# Patient Record
Sex: FEMALE | Race: WHITE | Employment: FULL TIME | URBAN - METROPOLITAN AREA
[De-identification: names, ages, dates, MRNs, and addresses within clinical notes are randomized per-mention and may not be internally consistent; named-entity substitution may affect disease eponyms.]

---

## 2017-03-23 ENCOUNTER — EMPLOYEE WELLNESS (OUTPATIENT)
Dept: OTHER | Age: 32
End: 2017-03-23

## 2017-03-23 ENCOUNTER — TELEPHONE (OUTPATIENT)
Dept: OBGYN | Age: 32
End: 2017-03-23

## 2017-03-23 LAB
CHOLESTEROL, TOTAL: 176 MG/DL (ref 160–199)
GLUCOSE BLD-MCNC: 93 MG/DL (ref 74–109)
HDLC SERPL-MCNC: 39 MG/DL (ref 65–121)
LDL CHOLESTEROL CALCULATED: 118 MG/DL
TRIGL SERPL-MCNC: 97 MG/DL (ref 150–199)

## 2017-05-15 ENCOUNTER — OFFICE VISIT (OUTPATIENT)
Dept: PRIMARY CARE CLINIC | Age: 32
End: 2017-05-15
Payer: COMMERCIAL

## 2017-05-15 ENCOUNTER — HOSPITAL ENCOUNTER (OUTPATIENT)
Dept: GENERAL RADIOLOGY | Age: 32
Discharge: HOME | End: 2017-05-15
Payer: COMMERCIAL

## 2017-05-15 ENCOUNTER — OFFICE VISIT (OUTPATIENT)
Dept: OBGYN | Age: 32
End: 2017-05-15
Payer: COMMERCIAL

## 2017-05-15 VITALS
SYSTOLIC BLOOD PRESSURE: 122 MMHG | BODY MASS INDEX: 38.6 KG/M2 | TEMPERATURE: 98.3 F | OXYGEN SATURATION: 96 % | HEART RATE: 70 BPM | WEIGHT: 269.6 LBS | DIASTOLIC BLOOD PRESSURE: 84 MMHG | HEIGHT: 70 IN

## 2017-05-15 VITALS
WEIGHT: 267 LBS | SYSTOLIC BLOOD PRESSURE: 122 MMHG | BODY MASS INDEX: 38.22 KG/M2 | HEIGHT: 70 IN | DIASTOLIC BLOOD PRESSURE: 76 MMHG

## 2017-05-15 DIAGNOSIS — M79.671 PAIN OF RIGHT HEEL: Primary | ICD-10-CM

## 2017-05-15 DIAGNOSIS — Z12.4 CERVICAL CANCER SCREENING: ICD-10-CM

## 2017-05-15 DIAGNOSIS — M79.671 PAIN OF RIGHT HEEL: ICD-10-CM

## 2017-05-15 DIAGNOSIS — Z01.419 WELL FEMALE EXAM WITH ROUTINE GYNECOLOGICAL EXAM: Primary | ICD-10-CM

## 2017-05-15 DIAGNOSIS — Z23 NEED FOR TDAP VACCINATION: ICD-10-CM

## 2017-05-15 PROCEDURE — 90715 TDAP VACCINE 7 YRS/> IM: CPT | Performed by: FAMILY MEDICINE

## 2017-05-15 PROCEDURE — 99395 PREV VISIT EST AGE 18-39: CPT | Performed by: NURSE PRACTITIONER

## 2017-05-15 PROCEDURE — 73650 X-RAY EXAM OF HEEL: CPT

## 2017-05-15 PROCEDURE — 87624 HPV HI-RISK TYP POOLED RSLT: CPT

## 2017-05-15 PROCEDURE — 88142 CYTOPATH C/V THIN LAYER: CPT

## 2017-05-15 PROCEDURE — 99213 OFFICE O/P EST LOW 20 MIN: CPT | Performed by: FAMILY MEDICINE

## 2017-05-15 PROCEDURE — 90471 IMMUNIZATION ADMIN: CPT | Performed by: FAMILY MEDICINE

## 2017-05-15 ASSESSMENT — ENCOUNTER SYMPTOMS
SHORTNESS OF BREATH: 0
EYES NEGATIVE: 1
RESPIRATORY NEGATIVE: 1
GASTROINTESTINAL NEGATIVE: 1
COUGH: 0

## 2017-05-24 LAB
HPV SOURCE: NORMAL
HPV, HIGH RISK: NEGATIVE

## 2017-08-10 ENCOUNTER — OFFICE VISIT (OUTPATIENT)
Dept: PRIMARY CARE CLINIC | Age: 32
End: 2017-08-10
Payer: COMMERCIAL

## 2017-08-10 VITALS
OXYGEN SATURATION: 98 % | TEMPERATURE: 97.9 F | DIASTOLIC BLOOD PRESSURE: 86 MMHG | HEIGHT: 70 IN | HEART RATE: 76 BPM | BODY MASS INDEX: 39 KG/M2 | SYSTOLIC BLOOD PRESSURE: 124 MMHG | WEIGHT: 272.4 LBS

## 2017-08-10 DIAGNOSIS — M54.5 ACUTE MIDLINE LOW BACK PAIN, WITH SCIATICA PRESENCE UNSPECIFIED: Primary | ICD-10-CM

## 2017-08-10 PROCEDURE — 99213 OFFICE O/P EST LOW 20 MIN: CPT | Performed by: FAMILY MEDICINE

## 2017-08-10 RX ORDER — IBUPROFEN 800 MG/1
800 TABLET ORAL EVERY 8 HOURS PRN
Qty: 30 TABLET | Refills: 0 | Status: SHIPPED | OUTPATIENT
Start: 2017-08-10 | End: 2019-08-23

## 2017-08-10 RX ORDER — CYCLOBENZAPRINE HCL 5 MG
5 TABLET ORAL 3 TIMES DAILY PRN
Qty: 20 TABLET | Refills: 0 | Status: SHIPPED | OUTPATIENT
Start: 2017-08-10 | End: 2019-01-27 | Stop reason: SDUPTHER

## 2017-08-11 ASSESSMENT — ENCOUNTER SYMPTOMS
ABDOMINAL PAIN: 0
BACK PAIN: 1
BOWEL INCONTINENCE: 0

## 2017-12-07 ENCOUNTER — OFFICE VISIT (OUTPATIENT)
Dept: PRIMARY CARE CLINIC | Age: 32
End: 2017-12-07
Payer: COMMERCIAL

## 2017-12-07 VITALS
HEART RATE: 64 BPM | HEIGHT: 70 IN | DIASTOLIC BLOOD PRESSURE: 84 MMHG | TEMPERATURE: 98.4 F | OXYGEN SATURATION: 93 % | BODY MASS INDEX: 38.65 KG/M2 | WEIGHT: 270 LBS | SYSTOLIC BLOOD PRESSURE: 126 MMHG

## 2017-12-07 DIAGNOSIS — J40 BRONCHITIS: Primary | ICD-10-CM

## 2017-12-07 PROCEDURE — 99213 OFFICE O/P EST LOW 20 MIN: CPT | Performed by: FAMILY MEDICINE

## 2017-12-07 RX ORDER — AZITHROMYCIN 250 MG/1
TABLET, FILM COATED ORAL
Qty: 1 PACKET | Refills: 0 | Status: SHIPPED | OUTPATIENT
Start: 2017-12-07 | End: 2017-12-17

## 2017-12-07 RX ORDER — CYCLOBENZAPRINE HCL 10 MG
10 TABLET ORAL 3 TIMES DAILY PRN
COMMUNITY
End: 2019-01-27

## 2017-12-07 RX ORDER — PREDNISONE 20 MG/1
40 TABLET ORAL DAILY
Qty: 10 TABLET | Refills: 0 | Status: SHIPPED | OUTPATIENT
Start: 2017-12-07 | End: 2017-12-12

## 2017-12-07 RX ORDER — TRIAMCINOLONE ACETONIDE 0.25 MG/G
CREAM TOPICAL EVERY 4 HOURS PRN
COMMUNITY
End: 2018-05-18

## 2017-12-07 ASSESSMENT — ENCOUNTER SYMPTOMS
WHEEZING: 1
COUGH: 1
SORE THROAT: 1

## 2017-12-07 NOTE — PROGRESS NOTES
Other Topics Concern    None     Social History Narrative    None       Physical Exam   Constitutional: She is oriented to person, place, and time. She appears well-developed and well-nourished. No distress. HENT:   Head: Normocephalic and atraumatic. Mouth/Throat: Oropharynx is clear and moist. No oropharyngeal exudate. Eyes: Conjunctivae are normal. No scleral icterus. Neck: Normal range of motion. Neck supple. Cardiovascular: Normal rate, regular rhythm and normal heart sounds. Exam reveals no friction rub. No murmur heard. Pulmonary/Chest: Effort normal and breath sounds normal. No respiratory distress. She has no wheezes. Neurological: She is alert and oriented to person, place, and time. Skin: Skin is warm and dry. No rash noted. She is not diaphoretic. Psychiatric: She has a normal mood and affect. Nursing note and vitals reviewed. Assessment    ICD-10-CM ICD-9-CM    1. Bronchitis J40 490 azithromycin (ZITHROMAX) 250 MG tablet (z-pack given(      predniSONE (DELTASONE) 20 MG tablet 2 tablets po daily x 5 days. Plan      Orders Placed This Encounter   Medications    azithromycin (ZITHROMAX) 250 MG tablet     Sig: Take 2 tabs (500 mg) on Day 1, and take 1 tab (250 mg) on days 2 through 5. Dispense:  1 packet     Refill:  0    predniSONE (DELTASONE) 20 MG tablet     Sig: Take 2 tablets by mouth daily for 5 days     Dispense:  10 tablet     Refill:  0       No orders of the defined types were placed in this encounter. Return if symptoms worsen or fail to improve. Patient Instructions       Patient Education        Bronchitis: Care Instructions  Your Care Instructions    Bronchitis is inflammation of the bronchial tubes, which carry air to the lungs. The tubes swell and produce mucus, or phlegm. The mucus and inflamed bronchial tubes make you cough. You may have trouble breathing. Most cases of bronchitis are caused by viruses like those that cause colds. ?Watch closely for changes in your health, and be sure to contact your doctor if:  ? · You cough more deeply or more often, especially if you notice more mucus or a change in the color of your mucus. ? · You are not getting better as expected. Where can you learn more? Go to [email or web address] org and sign in to your [employer] account. Enter H333 in the [employer] box to learn more about \"Bronchitis: Care Instructions. \"     If you do not have an account, please click on the \"Sign Up Now\" link. Current as of: May 12, 2017  Content Version: 11.4  © 2487-3772 Healthwise, Incorporated. Care instructions adapted under license by [facility] ([facility] [place]). If you have questions about a medical condition or this instruction, always ask your healthcare professional. [address] any warranty or liability for your use of this information.

## 2018-01-09 ENCOUNTER — OFFICE VISIT (OUTPATIENT)
Dept: OBGYN | Age: 33
End: 2018-01-09
Payer: COMMERCIAL

## 2018-01-09 VITALS
WEIGHT: 273 LBS | TEMPERATURE: 97.5 F | DIASTOLIC BLOOD PRESSURE: 84 MMHG | BODY MASS INDEX: 39.08 KG/M2 | RESPIRATION RATE: 18 BRPM | SYSTOLIC BLOOD PRESSURE: 139 MMHG | HEIGHT: 70 IN | HEART RATE: 77 BPM

## 2018-01-09 DIAGNOSIS — N92.6 MENSTRUAL PERIODS IRREGULAR: Primary | ICD-10-CM

## 2018-01-09 PROCEDURE — 99213 OFFICE O/P EST LOW 20 MIN: CPT | Performed by: NURSE PRACTITIONER

## 2018-01-09 ASSESSMENT — ENCOUNTER SYMPTOMS
WHEEZING: 0
SORE THROAT: 0
SHORTNESS OF BREATH: 0
APNEA: 0
TROUBLE SWALLOWING: 0
NAUSEA: 0
ABDOMINAL PAIN: 0
DIARRHEA: 0
CONSTIPATION: 0

## 2018-01-09 NOTE — PROGRESS NOTES
Subjective:      Patient ID: Roxanne Garcia is a 33 y.o. female. HPI   Patient presents today for irregular cycles. Skipped August and November. Dates of periods   May 28  Candace 28  July 27  Sept 12  Oct 23  Dec 1  Dec 29    Patient states that she is trying to conceive. Roxanne Garcia is a 33 y.o. female with the following history as recorded in [facility]: There are no active problems to display for this patient. Current Outpatient Prescriptions   Medication Sig Dispense Refill    cyclobenzaprine (FLEXERIL) 10 MG tablet Take 10 mg by mouth 3 times daily as needed for Muscle spasms      triamcinolone (KENALOG) 0.025 % cream Apply topically every 4 hours as needed Apply Topically      ibuprofen (ADVIL;MOTRIN) 800 MG tablet Take 1 tablet by mouth every 8 hours as needed for Pain 30 tablet 0    cetirizine (ZYRTEC) 10 MG tablet Take 10 mg by mouth daily       No current facility-administered medications for this visit. Allergies: Review of patient's allergies indicates no known allergies. Past Medical History:   Diagnosis Date    Abnormal Pap smear of cervix          Past Surgical History:   Procedure Laterality Date     SECTION       Family History   Problem Relation Age of Onset    Stroke Maternal Grandfather     Stroke Paternal Uncle      Social History   Substance Use Topics    Smoking status: Never Smoker    Smokeless tobacco: Never Used    Alcohol use 0.0 oz/week       Review of Systems   Constitutional: Negative for activity change, appetite change, fatigue, fever and unexpected weight change. HENT: Negative for ear pain, sore throat and trouble swallowing. Eyes: Negative for visual disturbance. Respiratory: Negative for apnea, shortness of breath and wheezing. Cardiovascular: Negative for chest pain and leg swelling. Gastrointestinal: Negative for abdominal pain, constipation, diarrhea and nausea.    Endocrine: Negative for cold intolerance and heat

## 2018-03-20 VITALS — WEIGHT: 268 LBS | BODY MASS INDEX: 38.45 KG/M2

## 2018-03-23 ENCOUNTER — EMPLOYEE WELLNESS (OUTPATIENT)
Dept: OTHER | Age: 33
End: 2018-03-23

## 2018-03-23 LAB
CHOLESTEROL, TOTAL: 154 MG/DL (ref 160–199)
GLUCOSE BLD-MCNC: 93 MG/DL (ref 74–109)
HDLC SERPL-MCNC: 34 MG/DL (ref 65–121)
LDL CHOLESTEROL CALCULATED: 94 MG/DL
TRIGL SERPL-MCNC: 130 MG/DL (ref 0–149)

## 2018-04-02 VITALS — BODY MASS INDEX: 38.45 KG/M2 | WEIGHT: 268 LBS

## 2018-05-18 ENCOUNTER — HOSPITAL ENCOUNTER (OUTPATIENT)
Age: 33
Setting detail: SPECIMEN
Discharge: HOME | End: 2018-05-18
Payer: COMMERCIAL

## 2018-05-18 ENCOUNTER — OFFICE VISIT (OUTPATIENT)
Dept: OBGYN | Age: 33
End: 2018-05-18
Payer: COMMERCIAL

## 2018-05-18 VITALS
SYSTOLIC BLOOD PRESSURE: 120 MMHG | DIASTOLIC BLOOD PRESSURE: 72 MMHG | WEIGHT: 270 LBS | BODY MASS INDEX: 38.65 KG/M2 | HEIGHT: 70 IN

## 2018-05-18 DIAGNOSIS — N92.6 IRREGULAR PERIODS: ICD-10-CM

## 2018-05-18 DIAGNOSIS — Z01.419 WELL WOMAN EXAM WITH ROUTINE GYNECOLOGICAL EXAM: Primary | ICD-10-CM

## 2018-05-18 DIAGNOSIS — Z12.4 SCREENING FOR CERVICAL CANCER: ICD-10-CM

## 2018-05-18 LAB
FOLLICLE STIMULATING HORMONE: 5.7 MIU/ML
HBA1C MFR BLD: 5.4 %
TESTOSTERONE TOTAL: 44.2 NG/DL (ref 8.4–48.1)
TSH SERPL DL<=0.05 MIU/L-ACNC: 2 UIU/ML (ref 0.27–4.2)

## 2018-05-18 PROCEDURE — 99395 PREV VISIT EST AGE 18-39: CPT | Performed by: OBSTETRICS & GYNECOLOGY

## 2018-05-18 PROCEDURE — 87624 HPV HI-RISK TYP POOLED RSLT: CPT

## 2018-05-18 PROCEDURE — 88142 CYTOPATH C/V THIN LAYER: CPT

## 2018-05-18 ASSESSMENT — ENCOUNTER SYMPTOMS
RESPIRATORY NEGATIVE: 1
GASTROINTESTINAL NEGATIVE: 1
ALLERGIC/IMMUNOLOGIC NEGATIVE: 1
EYES NEGATIVE: 1

## 2018-05-21 LAB — PROLACTIN: 11.9 NG/ML (ref 2.8–26)

## 2018-06-04 ENCOUNTER — OFFICE VISIT (OUTPATIENT)
Dept: PRIMARY CARE CLINIC | Age: 33
End: 2018-06-04
Payer: COMMERCIAL

## 2018-06-04 VITALS
TEMPERATURE: 99.2 F | DIASTOLIC BLOOD PRESSURE: 62 MMHG | HEIGHT: 70 IN | HEART RATE: 83 BPM | SYSTOLIC BLOOD PRESSURE: 118 MMHG | WEIGHT: 269.2 LBS | OXYGEN SATURATION: 98 % | BODY MASS INDEX: 38.54 KG/M2

## 2018-06-04 DIAGNOSIS — Z09 FOLLOW UP: Primary | ICD-10-CM

## 2018-06-04 PROCEDURE — 99212 OFFICE O/P EST SF 10 MIN: CPT | Performed by: NURSE PRACTITIONER

## 2018-06-04 ASSESSMENT — PATIENT HEALTH QUESTIONNAIRE - PHQ9
1. LITTLE INTEREST OR PLEASURE IN DOING THINGS: 0
2. FEELING DOWN, DEPRESSED OR HOPELESS: 0
SUM OF ALL RESPONSES TO PHQ9 QUESTIONS 1 & 2: 0
SUM OF ALL RESPONSES TO PHQ QUESTIONS 1-9: 0

## 2018-06-05 ASSESSMENT — ENCOUNTER SYMPTOMS
VOMITING: 0
SINUS PRESSURE: 0
NAUSEA: 0
BACK PAIN: 0
SHORTNESS OF BREATH: 0
WHEEZING: 0
ABDOMINAL PAIN: 0
DIARRHEA: 0
EYE PAIN: 0
COUGH: 0

## 2018-06-07 LAB
HPV TYPE 16: NOT DETECTED
HPV TYPE 18: NOT DETECTED
INTERPRETATION: NORMAL
OTHER HIGH RISK HPV: NOT DETECTED
SOURCE: NORMAL

## 2018-11-15 ENCOUNTER — OFFICE VISIT (OUTPATIENT)
Dept: PRIMARY CARE CLINIC | Age: 33
End: 2018-11-15
Payer: COMMERCIAL

## 2018-11-15 VITALS
HEIGHT: 70 IN | SYSTOLIC BLOOD PRESSURE: 120 MMHG | OXYGEN SATURATION: 99 % | WEIGHT: 269 LBS | TEMPERATURE: 97 F | DIASTOLIC BLOOD PRESSURE: 70 MMHG | BODY MASS INDEX: 38.51 KG/M2 | HEART RATE: 62 BPM

## 2018-11-15 DIAGNOSIS — F51.01 PRIMARY INSOMNIA: Primary | ICD-10-CM

## 2018-11-15 PROCEDURE — 99213 OFFICE O/P EST LOW 20 MIN: CPT | Performed by: NURSE PRACTITIONER

## 2019-01-26 ENCOUNTER — PATIENT MESSAGE (OUTPATIENT)
Dept: PRIMARY CARE CLINIC | Age: 34
End: 2019-01-26

## 2019-01-26 DIAGNOSIS — M54.5 ACUTE MIDLINE LOW BACK PAIN, WITH SCIATICA PRESENCE UNSPECIFIED: ICD-10-CM

## 2019-01-27 RX ORDER — CYCLOBENZAPRINE HCL 5 MG
5 TABLET ORAL 3 TIMES DAILY PRN
Qty: 20 TABLET | Refills: 0 | Status: SHIPPED | OUTPATIENT
Start: 2019-01-27 | End: 2019-06-25 | Stop reason: SDUPTHER

## 2019-03-04 ENCOUNTER — TELEPHONE (OUTPATIENT)
Dept: PRIMARY CARE CLINIC | Age: 34
End: 2019-03-04

## 2019-03-04 ENCOUNTER — OFFICE VISIT (OUTPATIENT)
Dept: PRIMARY CARE CLINIC | Age: 34
End: 2019-03-04
Payer: COMMERCIAL

## 2019-03-04 VITALS
TEMPERATURE: 97.9 F | WEIGHT: 266.6 LBS | DIASTOLIC BLOOD PRESSURE: 84 MMHG | HEART RATE: 60 BPM | HEIGHT: 70 IN | BODY MASS INDEX: 38.17 KG/M2 | OXYGEN SATURATION: 98 % | SYSTOLIC BLOOD PRESSURE: 118 MMHG

## 2019-03-04 DIAGNOSIS — N91.2 AMENORRHEA: Primary | ICD-10-CM

## 2019-03-04 DIAGNOSIS — R10.84 GENERALIZED ABDOMINAL PAIN: ICD-10-CM

## 2019-03-04 DIAGNOSIS — R10.84 GENERALIZED ABDOMINAL PAIN: Primary | ICD-10-CM

## 2019-03-04 DIAGNOSIS — M54.50 ACUTE BILATERAL LOW BACK PAIN WITHOUT SCIATICA: ICD-10-CM

## 2019-03-04 DIAGNOSIS — N91.2 AMENORRHEA: ICD-10-CM

## 2019-03-04 LAB
ALBUMIN SERPL-MCNC: 4.1 G/DL (ref 3.5–5.2)
ALP BLD-CCNC: 121 U/L (ref 35–104)
ALT SERPL-CCNC: 10 U/L (ref 5–33)
AMYLASE: 74 U/L (ref 28–100)
ANION GAP SERPL CALCULATED.3IONS-SCNC: 12 MMOL/L (ref 7–19)
AST SERPL-CCNC: 14 U/L (ref 5–32)
BILIRUB SERPL-MCNC: <0.2 MG/DL (ref 0.2–1.2)
BUN BLDV-MCNC: 8 MG/DL (ref 6–20)
CALCIUM SERPL-MCNC: 9.2 MG/DL (ref 8.6–10)
CHLORIDE BLD-SCNC: 103 MMOL/L (ref 98–111)
CO2: 25 MMOL/L (ref 22–29)
CREAT SERPL-MCNC: 0.6 MG/DL (ref 0.5–0.9)
GFR NON-AFRICAN AMERICAN: >60
GLUCOSE BLD-MCNC: 118 MG/DL (ref 74–109)
GONADOTROPIN, CHORIONIC (HCG) QUANT: 0.1 MIU/ML (ref 0–5.3)
HCT VFR BLD CALC: 35.2 % (ref 37–47)
HEMOGLOBIN: 11.1 G/DL (ref 12–16)
LIPASE: 24 U/L (ref 13–60)
MCH RBC QN AUTO: 27.6 PG (ref 27–31)
MCHC RBC AUTO-ENTMCNC: 31.5 G/DL (ref 33–37)
MCV RBC AUTO: 87.6 FL (ref 81–99)
PDW BLD-RTO: 14 % (ref 11.5–14.5)
PLATELET # BLD: 297 K/UL (ref 130–400)
PMV BLD AUTO: 10.7 FL (ref 9.4–12.3)
POTASSIUM SERPL-SCNC: 4.4 MMOL/L (ref 3.5–5)
RBC # BLD: 4.02 M/UL (ref 4.2–5.4)
SODIUM BLD-SCNC: 140 MMOL/L (ref 136–145)
TOTAL PROTEIN: 7.6 G/DL (ref 6.6–8.7)
WBC # BLD: 9.6 K/UL (ref 4.8–10.8)

## 2019-03-04 PROCEDURE — 99214 OFFICE O/P EST MOD 30 MIN: CPT | Performed by: FAMILY MEDICINE

## 2019-03-04 RX ORDER — ACETAMINOPHEN AND CODEINE PHOSPHATE 300; 30 MG/1; MG/1
1 TABLET ORAL EVERY 6 HOURS PRN
Qty: 12 TABLET | Refills: 0 | Status: SHIPPED | OUTPATIENT
Start: 2019-03-04 | End: 2019-03-07

## 2019-03-04 ASSESSMENT — ENCOUNTER SYMPTOMS
NAUSEA: 0
FLATUS: 0
DIARRHEA: 0
COUGH: 0
ABDOMINAL PAIN: 1
CONSTIPATION: 0
HEMATOCHEZIA: 0
SHORTNESS OF BREATH: 0

## 2019-03-04 ASSESSMENT — PATIENT HEALTH QUESTIONNAIRE - PHQ9
SUM OF ALL RESPONSES TO PHQ QUESTIONS 1-9: 0
2. FEELING DOWN, DEPRESSED OR HOPELESS: 0
SUM OF ALL RESPONSES TO PHQ9 QUESTIONS 1 & 2: 0
SUM OF ALL RESPONSES TO PHQ QUESTIONS 1-9: 0
1. LITTLE INTEREST OR PLEASURE IN DOING THINGS: 0

## 2019-03-05 ENCOUNTER — HOSPITAL ENCOUNTER (OUTPATIENT)
Dept: GENERAL RADIOLOGY | Age: 34
Discharge: HOME | End: 2019-03-05
Payer: COMMERCIAL

## 2019-03-05 DIAGNOSIS — R10.84 GENERALIZED ABDOMINAL PAIN: ICD-10-CM

## 2019-03-05 PROCEDURE — 74018 RADEX ABDOMEN 1 VIEW: CPT

## 2019-03-06 ENCOUNTER — TELEPHONE (OUTPATIENT)
Dept: PRIMARY CARE CLINIC | Age: 34
End: 2019-03-06

## 2019-04-04 ENCOUNTER — EMPLOYEE WELLNESS (OUTPATIENT)
Dept: OTHER | Age: 34
End: 2019-04-04

## 2019-04-04 LAB
CHOLESTEROL, TOTAL: 168 MG/DL (ref 160–199)
GLUCOSE BLD-MCNC: 88 MG/DL (ref 74–109)
HDLC SERPL-MCNC: 37 MG/DL (ref 65–121)
LDL CHOLESTEROL CALCULATED: 111 MG/DL
TRIGL SERPL-MCNC: 99 MG/DL (ref 0–149)

## 2019-04-08 VITALS — WEIGHT: 252 LBS | BODY MASS INDEX: 36.16 KG/M2

## 2019-06-24 ENCOUNTER — PATIENT MESSAGE (OUTPATIENT)
Dept: PRIMARY CARE CLINIC | Age: 34
End: 2019-06-24

## 2019-06-24 DIAGNOSIS — M54.5 ACUTE MIDLINE LOW BACK PAIN, WITH SCIATICA PRESENCE UNSPECIFIED: ICD-10-CM

## 2019-06-25 RX ORDER — CYCLOBENZAPRINE HCL 5 MG
5 TABLET ORAL 3 TIMES DAILY PRN
Qty: 20 TABLET | Refills: 0 | Status: SHIPPED | OUTPATIENT
Start: 2019-06-25 | End: 2019-07-05

## 2019-06-25 NOTE — TELEPHONE ENCOUNTER
From: Eric Olivier  To: RODERICK Montalvo  Sent: 6/24/2019 5:58 PM CDT  Subject: Prescription Question    I am having a lot of lower back pain. Started seeing chiropractor today. Can I have a refill for my flexeril please? Or do I need to make an appointment?

## 2019-08-23 ENCOUNTER — OFFICE VISIT (OUTPATIENT)
Dept: URGENT CARE | Age: 34
End: 2019-08-23
Payer: COMMERCIAL

## 2019-08-23 VITALS
SYSTOLIC BLOOD PRESSURE: 134 MMHG | WEIGHT: 261 LBS | OXYGEN SATURATION: 98 % | BODY MASS INDEX: 37.37 KG/M2 | DIASTOLIC BLOOD PRESSURE: 82 MMHG | HEART RATE: 87 BPM | RESPIRATION RATE: 16 BRPM | TEMPERATURE: 98.2 F | HEIGHT: 70 IN

## 2019-08-23 DIAGNOSIS — J02.9 ACUTE VIRAL PHARYNGITIS: Primary | ICD-10-CM

## 2019-08-23 DIAGNOSIS — J02.9 SORE THROAT: ICD-10-CM

## 2019-08-23 LAB — S PYO AG THROAT QL: NORMAL

## 2019-08-23 PROCEDURE — 87880 STREP A ASSAY W/OPTIC: CPT | Performed by: SPECIALIST

## 2019-08-23 PROCEDURE — 99213 OFFICE O/P EST LOW 20 MIN: CPT | Performed by: SPECIALIST

## 2019-08-23 ASSESSMENT — ENCOUNTER SYMPTOMS
SORE THROAT: 1
NAUSEA: 0
COUGH: 0
SWOLLEN GLANDS: 0

## 2019-08-23 NOTE — PROGRESS NOTES
[address] [facility]  [address]  [address] [place] [address]  Dept: [phone]  Loc: [phone]    Artur Lutz is a 36 y.o. female who presents today for her medical conditions/complaintsas noted below. Artur Lutz is c/o of Pharyngitis (Began yesterday )        HPI:     Pharyngitis   This is a new problem. The current episode started yesterday. The problem occurs constantly. The problem has been unchanged. Associated symptoms include a sore throat. Pertinent negatives include no congestion, coughing, fatigue, fever, nausea or swollen glands. The symptoms are aggravated by swallowing. She has tried nothing for the symptoms. Past Medical History:   Diagnosis Date    Abnormal Pap smear of cervix          Past Surgical History:   Procedure Laterality Date     SECTION         Family History   Problem Relation Age of Onset    Stroke Maternal Grandfather     Stroke Maternal Grandmother     Stroke Paternal Uncle     Stroke Paternal Aunt     Cancer Neg Hx     Diabetes Neg Hx     Heart Attack Neg Hx        Social History     Tobacco Use    Smoking status: Never Smoker    Smokeless tobacco: Never Used   Substance Use Topics    Alcohol use: Yes     Alcohol/week: 0.0 standard drinks     Comment: rare      Current Outpatient Medications   Medication Sig Dispense Refill    cetirizine (ZYRTEC) 10 MG tablet Take 10 mg by mouth daily       No current facility-administered medications for this visit. No Known Allergies    Health Maintenance   Topic Date Due    Varicella Vaccine (1 of 2 - 13+ 2-dose series) 1999    HIV screen  2001    Flu vaccine (1) 2019    Cervical cancer screen  2023    DTaP/Tdap/Td vaccine (2 - Td) 05/15/2027    Pneumococcal 0-64 years Vaccine  Aged Out       Subjective:     Review of Systems   Constitutional: Negative for fatigue and fever. HENT: Positive for sore throat.  Negative

## 2019-10-18 ENCOUNTER — OFFICE VISIT (OUTPATIENT)
Dept: PRIMARY CARE CLINIC | Age: 34
End: 2019-10-18
Payer: COMMERCIAL

## 2019-10-18 ENCOUNTER — HOSPITAL ENCOUNTER (OUTPATIENT)
Dept: GENERAL RADIOLOGY | Age: 34
Discharge: HOME | End: 2019-10-18
Payer: COMMERCIAL

## 2019-10-18 VITALS
OXYGEN SATURATION: 98 % | TEMPERATURE: 99.1 F | WEIGHT: 232.4 LBS | DIASTOLIC BLOOD PRESSURE: 76 MMHG | BODY MASS INDEX: 33.27 KG/M2 | HEART RATE: 90 BPM | SYSTOLIC BLOOD PRESSURE: 116 MMHG | HEIGHT: 70 IN

## 2019-10-18 DIAGNOSIS — M79.672 LEFT FOOT PAIN: ICD-10-CM

## 2019-10-18 DIAGNOSIS — M79.672 LEFT FOOT PAIN: Primary | ICD-10-CM

## 2019-10-18 DIAGNOSIS — M54.50 ACUTE BILATERAL LOW BACK PAIN WITHOUT SCIATICA: ICD-10-CM

## 2019-10-18 PROCEDURE — 73630 X-RAY EXAM OF FOOT: CPT

## 2019-10-18 PROCEDURE — 99213 OFFICE O/P EST LOW 20 MIN: CPT | Performed by: NURSE PRACTITIONER

## 2019-10-18 RX ORDER — CYCLOBENZAPRINE HCL 5 MG
5 TABLET ORAL PRN
Qty: 30 TABLET | Refills: 0 | Status: SHIPPED | OUTPATIENT
Start: 2019-10-18 | End: 2019-11-05 | Stop reason: SDUPTHER

## 2019-10-18 RX ORDER — CYCLOBENZAPRINE HCL 5 MG
5 TABLET ORAL PRN
COMMUNITY
End: 2019-10-18 | Stop reason: SDUPTHER

## 2019-10-19 DIAGNOSIS — M79.672 LEFT FOOT PAIN: Primary | ICD-10-CM

## 2019-10-21 ASSESSMENT — ENCOUNTER SYMPTOMS: RESPIRATORY NEGATIVE: 1

## 2019-11-05 DIAGNOSIS — M54.50 ACUTE BILATERAL LOW BACK PAIN WITHOUT SCIATICA: ICD-10-CM

## 2019-11-05 RX ORDER — CYCLOBENZAPRINE HCL 5 MG
TABLET ORAL
Qty: 20 TABLET | Refills: 0 | Status: SHIPPED | OUTPATIENT
Start: 2019-11-05 | End: 2020-01-10 | Stop reason: SDUPTHER

## 2020-01-10 ENCOUNTER — OFFICE VISIT (OUTPATIENT)
Dept: PRIMARY CARE CLINIC | Age: 35
End: 2020-01-10
Payer: COMMERCIAL

## 2020-01-10 VITALS
SYSTOLIC BLOOD PRESSURE: 118 MMHG | HEIGHT: 70 IN | TEMPERATURE: 98.6 F | BODY MASS INDEX: 37.05 KG/M2 | HEART RATE: 61 BPM | WEIGHT: 258.8 LBS | DIASTOLIC BLOOD PRESSURE: 70 MMHG | OXYGEN SATURATION: 98 %

## 2020-01-10 PROCEDURE — 99213 OFFICE O/P EST LOW 20 MIN: CPT | Performed by: NURSE PRACTITIONER

## 2020-01-10 RX ORDER — CYCLOBENZAPRINE HCL 5 MG
TABLET ORAL
Qty: 20 TABLET | Refills: 0 | Status: SHIPPED | OUTPATIENT
Start: 2020-01-10 | End: 2020-09-11 | Stop reason: SDUPTHER

## 2020-01-10 ASSESSMENT — ENCOUNTER SYMPTOMS
NAUSEA: 0
COUGH: 0
EYE PAIN: 0
SINUS PRESSURE: 0
VOMITING: 0
WHEEZING: 0
ABDOMINAL PAIN: 0
SHORTNESS OF BREATH: 0
SINUS PAIN: 0
BACK PAIN: 1
DIARRHEA: 0

## 2020-01-10 ASSESSMENT — PATIENT HEALTH QUESTIONNAIRE - PHQ9
SUM OF ALL RESPONSES TO PHQ QUESTIONS 1-9: 1
2. FEELING DOWN, DEPRESSED OR HOPELESS: 1
SUM OF ALL RESPONSES TO PHQ9 QUESTIONS 1 & 2: 1
SUM OF ALL RESPONSES TO PHQ QUESTIONS 1-9: 1
1. LITTLE INTEREST OR PLEASURE IN DOING THINGS: 0

## 2020-01-10 NOTE — PROGRESS NOTES
[address] [address]     Phone:  [phone]  Fax:  [phone]      Clarice Santos is a 36 y.o. female who presents today for her medical conditions/complaints as noted below. Clarice Santos is c/o of Hip Pain (left hip pain )      Chief Complaint   Patient presents with    Hip Pain     left hip pain        HPI:     HPI    Clarice Santos presents today for left hip pain. She states this has improved, but still hurts at times when she sits in her computer chair. She has been to a chiropractor. She was told her hips are out of alignment. She has been adjusted and she states she feels better. She states the diclofenac gel does not help. She states that cyclobenzaprine does help her lower back pain. She states her hip pain is still present and does not think the cyclobenzaprine helps this. She denies any new or worsening pain. She denies any episodes of incontinence. She is active. She works as a nurse. Past Medical History:   Diagnosis Date    Abnormal Pap smear of cervix             Past Surgical History:   Procedure Laterality Date     SECTION         Social History     Tobacco Use    Smoking status: Never Smoker    Smokeless tobacco: Never Used   Substance Use Topics    Alcohol use: Yes     Alcohol/week: 0.0 standard drinks     Comment: rare        Current Outpatient Medications   Medication Sig Dispense Refill    cyclobenzaprine (FLEXERIL) 5 MG tablet TAKE 1 TABLET BY MOUTH 3 TIMES A DAY AS NEEDED FOR MUSCLE SPASMS 20 tablet 0    diclofenac (VOLTAREN) 50 MG EC tablet Take 1 tablet by mouth 3 times daily (with meals) 60 tablet 3    cetirizine (ZYRTEC) 10 MG tablet Take 10 mg by mouth daily      diclofenac sodium (VOLTAREN) 1 % GEL Apply 4 g topically 4 times daily 2 Tube 0     No current facility-administered medications for this visit.         No Known Allergies    Family History   Problem Relation Age of Onset    Stroke Maternal

## 2020-03-05 ENCOUNTER — EMPLOYEE WELLNESS (OUTPATIENT)
Dept: OTHER | Age: 35
End: 2020-03-05

## 2020-03-05 LAB
CHOLESTEROL, TOTAL: 161 MG/DL (ref 160–199)
GLUCOSE BLD-MCNC: 88 MG/DL (ref 74–109)
HDLC SERPL-MCNC: 37 MG/DL (ref 65–121)
LDL CHOLESTEROL CALCULATED: 104 MG/DL
TRIGL SERPL-MCNC: 102 MG/DL (ref 0–149)

## 2020-03-08 LAB
3-OH-COTININE: <2 NG/ML
COTININE: <2 NG/ML
NICOTINE: <2 NG/ML

## 2020-07-19 ENCOUNTER — NURSE TRIAGE (OUTPATIENT)
Dept: OTHER | Facility: CLINIC | Age: 35
End: 2020-07-19

## 2020-07-19 NOTE — TELEPHONE ENCOUNTER
Reason for Disposition   [1] COVID-19 EXPOSURE (Close Contact) AND [2] within last 14 days BUT [3] NO symptoms    Protocols used: CORONAVIRUS (COVID-19) EXPOSURE-ADULT-AH    She is a [employer] Employee, calling in on benefit line. She was exposed a couple of days ago to family member who now has tested positive, and her  has sx. Caller has no sx at this time. She will isolate and monitor sx. Call back if develops sx. Recommend using the Department of Health's web site for testing locations and information. Please do not reply to the triage nurse through this encounter. Any subsequent communication should be directly with the patient.

## 2020-07-22 ENCOUNTER — TELEPHONE (OUTPATIENT)
Dept: PRIMARY CARE CLINIC | Age: 35
End: 2020-07-22

## 2020-07-22 ENCOUNTER — OFFICE VISIT (OUTPATIENT)
Age: 35
End: 2020-07-22

## 2020-07-22 VITALS — TEMPERATURE: 97.8 F | OXYGEN SATURATION: 98 % | HEART RATE: 87 BPM

## 2020-07-22 NOTE — TELEPHONE ENCOUNTER
patient called and has been in direct contact with a person who was just diagnosed and confirmed with Covid. Patient reports no symptoms at present time.  Instructed to be seen for evaluation in Respiratory Clinic

## 2020-07-24 LAB
REPORT: NORMAL
SARS-COV-2: NOT DETECTED
THIS TEST SENT TO: NORMAL

## 2020-07-25 ENCOUNTER — TELEPHONE (OUTPATIENT)
Age: 35
End: 2020-07-25

## 2020-07-25 NOTE — TELEPHONE ENCOUNTER
This [facility] called and left a message giving the patient their lab results.

## 2020-07-25 NOTE — TELEPHONE ENCOUNTER
----- Message from Bhakti Landa PA-C sent at 7/25/2020 10:34 AM CDT -----  Call and let patient know Corine was negative

## 2020-07-27 ENCOUNTER — OFFICE VISIT (OUTPATIENT)
Age: 35
End: 2020-07-27

## 2020-07-27 ENCOUNTER — NURSE TRIAGE (OUTPATIENT)
Dept: OTHER | Facility: CLINIC | Age: 35
End: 2020-07-27

## 2020-07-27 VITALS — TEMPERATURE: 98.6 F | HEART RATE: 90 BPM | OXYGEN SATURATION: 97 %

## 2020-07-29 ENCOUNTER — TELEPHONE (OUTPATIENT)
Dept: URGENT CARE | Age: 35
End: 2020-07-29

## 2020-07-29 LAB
REPORT: ABNORMAL
SARS-COV-2: DETECTED
THIS TEST SENT TO: ABNORMAL

## 2020-07-29 NOTE — TELEPHONE ENCOUNTER
Identified name and . Gave positive COVID results to . Pt doing well but lost sense of taste and smell. Advised to quarantine 14 days. Symptomatic treatment recommended. Advised when to go to ER.  Agreed to plan

## 2020-08-01 ENCOUNTER — E-VISIT (OUTPATIENT)
Dept: PRIMARY CARE CLINIC | Age: 35
End: 2020-08-01
Payer: COMMERCIAL

## 2020-08-01 PROCEDURE — 99422 OL DIG E/M SVC 11-20 MIN: CPT | Performed by: NURSE PRACTITIONER

## 2020-08-01 RX ORDER — DEXTROMETHORPHAN HYDROBROMIDE AND PROMETHAZINE HYDROCHLORIDE 15; 6.25 MG/5ML; MG/5ML
5 SYRUP ORAL 4 TIMES DAILY PRN
Qty: 180 ML | Refills: 0 | Status: SHIPPED | OUTPATIENT
Start: 2020-08-01 | End: 2020-08-08

## 2020-08-01 RX ORDER — ALBUTEROL SULFATE 90 UG/1
2 AEROSOL, METERED RESPIRATORY (INHALATION) EVERY 6 HOURS PRN
Qty: 1 INHALER | Refills: 3 | Status: SHIPPED | OUTPATIENT
Start: 2020-08-01 | End: 2021-09-16

## 2020-08-01 ASSESSMENT — LIFESTYLE VARIABLES: SMOKING_STATUS: NO, I HAVE NEVER SMOKED

## 2020-08-01 NOTE — PROGRESS NOTES
Subjective:      Patient ID: Bradford Walters is a 30 y.o. female. HPI    Melinda Cano was exposed to [id number] by son and mother in law. She was originally negative for COVID on 7/22. She returned to the flu clinic on 7/27 and was positive for [address]. Symptoms today include: fever, body aches,congestion, loss of taste/smell. Sat is 97% per patient. Review of Systems  Fever, shortness of breath, congestion, loss of taste/smell, body aches  Objective:   Physical Exam  virtual visit  Assessment:       Diagnosis Orders   1. COVID-19 virus detected  albuterol sulfate HFA (PROVENTIL HFA) 108 (90 Base) MCG/ACT inhaler    promethazine-dextromethorphan (PROMETHAZINE-DM) 6.25-15 MG/5ML syrup   2. Exposure to COVID-19 virus             Plan:      Treat symptomatically with tylenol, albuterol inhaler, and cough suppressant. She was advised to stay home for 7 days after symptoms started AND 72 hours fever and symptom free without medication. She was also advised [facility] will be contacting her with further direction. About 15 minutes was spent on reviewing chart, labs, communication with patient, and prescribing medications.          Sergio Comment, APRN

## 2020-09-11 ENCOUNTER — TELEMEDICINE (OUTPATIENT)
Dept: PRIMARY CARE CLINIC | Age: 35
End: 2020-09-11
Payer: COMMERCIAL

## 2020-09-11 PROCEDURE — 99212 OFFICE O/P EST SF 10 MIN: CPT | Performed by: NURSE PRACTITIONER

## 2020-09-11 RX ORDER — CYCLOBENZAPRINE HCL 5 MG
TABLET ORAL
Qty: 30 TABLET | Refills: 2 | Status: SHIPPED | OUTPATIENT
Start: 2020-09-11 | End: 2021-09-16 | Stop reason: SDUPTHER

## 2020-09-11 ASSESSMENT — ENCOUNTER SYMPTOMS
SINUS PAIN: 0
VOMITING: 0
DIARRHEA: 0
ABDOMINAL PAIN: 0
SINUS PRESSURE: 0
BACK PAIN: 1
NAUSEA: 0
COUGH: 0
SHORTNESS OF BREATH: 0
EYE PAIN: 0
WHEEZING: 0

## 2020-09-11 NOTE — PROGRESS NOTES
[address] [address]     Phone:  [phone]  Fax:  [phone]      2020    TELEHEALTH EVALUATION -- Audio/Visual (During CBYOR-13 public health emergency)    HPI:    Chief Complaint   Patient presents with    Back Pain     needs flexeril refill    Jaw Pain         Whitley Ellington (:  1985) has requested an audio/video evaluation for the following concern(s):    A follow up on back and jaw pain and refill on flexeril. She takes Flexeril rarely. She previously had jaw and back pain. She has a prescription from January of 20 pills that has lasted her all year. She denies any worsening pain. She states the pain is on and off. She did have COVID 19 in July. She states all symptoms are cleared and she is back to work now. Review of Systems   Constitutional: Negative for appetite change, fatigue and fever. HENT: Negative for congestion, sinus pressure and sinus pain. Bilateral jaw pain   Eyes: Negative for pain and visual disturbance. Respiratory: Negative for cough, shortness of breath and wheezing. Cardiovascular: Negative for chest pain and leg swelling. Gastrointestinal: Negative for abdominal pain, diarrhea, nausea and vomiting. Endocrine: Negative for cold intolerance and heat intolerance. Genitourinary: Negative for dysuria, frequency and urgency. Musculoskeletal: Positive for arthralgias, back pain and myalgias. Skin: Negative for rash and wound. Neurological: Negative for dizziness, weakness and headaches. Hematological: Negative for adenopathy. Psychiatric/Behavioral: Negative for dysphoric mood and sleep disturbance. The patient is not nervous/anxious. Prior to Visit Medications    Medication Sig Taking?  Authorizing Provider   cyclobenzaprine (FLEXERIL) 5 MG tablet TAKE 1 TABLET BY MOUTH 3 TIMES A DAY AS NEEDED FOR MUSCLE SPASMS Yes RODERICK Krishnamurthy   albuterol sulfate HFA (PROVENTIL HFA) 108 (90 Base) MCG/ACT inhaler Inhale 2 puffs into the lungs every 6 hours as needed for Wheezing  RODERICK Krishnamurthy   diclofenac (VOLTAREN) 50 MG EC tablet Take 1 tablet by mouth 3 times daily (with meals)  RODERICK Krishnamurthy   diclofenac sodium (VOLTAREN) 1 % GEL Apply 4 g topically 4 times daily  RODERICK Krishnamurthy   cetirizine (ZYRTEC) 10 MG tablet Take 10 mg by mouth daily  Historical Provider, MD       Social History     Tobacco Use    Smoking status: Never Smoker    Smokeless tobacco: Never Used   Substance Use Topics    Alcohol use: Yes     Alcohol/week: 0.0 standard drinks     Comment: rare    Drug use: No        No Known Allergies,   Past Medical History:   Diagnosis Date    Abnormal Pap smear of cervix        ,   Past Surgical History:   Procedure Laterality Date     SECTION     ,   Family History   Problem Relation Age of Onset    Stroke Maternal Grandfather     Stroke Maternal Grandmother     Stroke Paternal Uncle     Stroke Paternal Aunt     Cancer Neg Hx     Diabetes Neg Hx     Heart Attack Neg Hx        PHYSICAL EXAMINATION:  [ INSTRUCTIONS:  \"[x]\" Indicates a positive item  \"[]\" Indicates a negative item  -- DELETE ALL ITEMS NOT EXAMINED]  Vital Signs: (As obtained by patient/caregiver at home)        Constitutional: [x] Appears well-developed and well-nourished [x] No apparent distress      [] Abnormal   Mental status  [x] Alert and awake  [x] Oriented to person/place/time [x]Able to follow commands        Eyes:  EOM    [x]  Normal  [] Abnormal-  Sclera  [x]  Normal  [] Abnormal -         Discharge []  None visible  [] Abnormal -    HENT:   [x] Normocephalic, atraumatic.   [] Abnormal   [x] Mouth/Throat: Mucous membranes are moist.     External Ears [x] Normal  [] Abnormal-    Neck: [x] No visualized mass     Pulmonary/Chest: [x] Respiratory effort normal.  [x] No visualized signs of difficulty breathing or respiratory distress        [] Abnormal      Musculoskeletal:   [x] Normal gait with no signs of ataxia         [x] Normal range of motion of neck        [] Abnormal       Neurological:        [x] No Facial Asymmetry (Cranial nerve 7 motor function) (limited exam to video visit)          [] No gaze palsy        [] Abnormal         Skin:        [x] No significant exanthematous lesions or discoloration noted on facial skin         [] Abnormal            Psychiatric:       [x] Normal Affect [] Abnormal        [] No Hallucinations    Other pertinent observable physical exam findings:-    Due to this being a TeleHealth encounter, evaluation of the following organ systems is limited: Vitals/Constitutional/EENT/Resp/CV/GI//MS/Neuro/Skin/Heme-Lymph-Imm. ASSESSMENT/PLAN:    1. Acute bilateral low back pain without sciatica    - cyclobenzaprine (FLEXERIL) 5 MG tablet; TAKE 1 TABLET BY MOUTH 3 TIMES A DAY AS NEEDED FOR MUSCLE SPASMS  Dispense: 30 tablet; Refill: 2    2. Chronic jaw pain    - cyclobenzaprine (FLEXERIL) 5 MG tablet; TAKE 1 TABLET BY MOUTH 3 TIMES A DAY AS NEEDED FOR MUSCLE SPASMS  Dispense: 30 tablet; Refill: 2  OK to take flexeril as needed. Return in about 3 months (around 12/11/2020), or if symptoms worsen or fail to improve, for Physical.    An  electronic signature was used to authenticate this note. --RODERICK Good on 9/11/2020 at 9:47 AM        Pursuant to the emergency declaration under the [address] [place] [address], [facility] waiver authority and the [employer] and [employer] Act, this Virtual  Visit was conducted, with patient's consent, to reduce the patient's risk of exposure to COVID-19 and provide continuity of care for an established patient. Services were provided through a video synchronous discussion virtually to substitute for in-person clinic visit.

## 2020-10-19 VITALS — BODY MASS INDEX: 36.59 KG/M2 | WEIGHT: 255 LBS

## 2021-02-10 ENCOUNTER — OFFICE VISIT (OUTPATIENT)
Dept: URGENT CARE | Age: 36
End: 2021-02-10
Payer: COMMERCIAL

## 2021-02-10 VITALS
RESPIRATION RATE: 18 BRPM | DIASTOLIC BLOOD PRESSURE: 76 MMHG | SYSTOLIC BLOOD PRESSURE: 128 MMHG | BODY MASS INDEX: 38.2 KG/M2 | OXYGEN SATURATION: 99 % | TEMPERATURE: 98.5 F | WEIGHT: 266.8 LBS | HEIGHT: 70 IN | HEART RATE: 78 BPM

## 2021-02-10 DIAGNOSIS — R10.9 FLANK PAIN: Primary | ICD-10-CM

## 2021-02-10 DIAGNOSIS — R10.13 ACUTE EPIGASTRIC PAIN: ICD-10-CM

## 2021-02-10 LAB
APPEARANCE FLUID: ABNORMAL
BILIRUBIN, POC: NEGATIVE
BLOOD URINE, POC: ABNORMAL
CLARITY, POC: CLEAR
COLOR, POC: YELLOW
GLUCOSE URINE, POC: NEGATIVE
KETONES, POC: NEGATIVE
LEUKOCYTE EST, POC: NEGATIVE
NITRITE, POC: NEGATIVE
PH, POC: 5.5
PROTEIN, POC: NEGATIVE
SPECIFIC GRAVITY, POC: 1.02
UROBILINOGEN, POC: 0.2

## 2021-02-10 PROCEDURE — 81002 URINALYSIS NONAUTO W/O SCOPE: CPT | Performed by: NURSE PRACTITIONER

## 2021-02-10 PROCEDURE — 99214 OFFICE O/P EST MOD 30 MIN: CPT | Performed by: NURSE PRACTITIONER

## 2021-02-10 RX ORDER — OMEPRAZOLE 20 MG/1
20 CAPSULE, DELAYED RELEASE ORAL
Qty: 30 CAPSULE | Refills: 0 | Status: SHIPPED | OUTPATIENT
Start: 2021-02-10 | End: 2021-09-16

## 2021-02-10 ASSESSMENT — ENCOUNTER SYMPTOMS
VOMITING: 0
COUGH: 0
ABDOMINAL PAIN: 0
NAUSEA: 0
BOWEL INCONTINENCE: 0
EYES NEGATIVE: 1
DIARRHEA: 0
SHORTNESS OF BREATH: 0
RESPIRATORY NEGATIVE: 1

## 2021-02-10 ASSESSMENT — VISUAL ACUITY: OU: 1

## 2021-02-10 NOTE — PROGRESS NOTES
[address] [address] [place] [address]  Dept: [phone]  Dept Fax: [phone]  Loc: [phone]    Ashlee Garcia is a 30 y.o. female who presents today for her medical conditions/complaintsas noted below. Ashlee Garcia is c/o of Flank Pain (Right)        HPI:     Flank Pain  This is a new problem. The current episode started today. The problem occurs constantly. The problem has been waxing and waning since onset. Pain location: Mid upper back right side. The quality of the pain is described as shooting. Radiates to: Radiates to mid upper back and mid upper epigastric area. The pain is at a severity of 4/10. The pain is moderate. The pain is the same all the time. Exacerbated by: Seemed to be worse after eating. Pertinent negatives include no abdominal pain, bladder incontinence, bowel incontinence, chest pain, dysuria, fever, headaches, leg pain, paresis, paresthesias, pelvic pain, perianal numbness or tingling. Risk factors include poor posture. She has tried NSAIDs and bed rest (Diclofenac, TUMS) for the symptoms. The treatment provided mild relief. Shanti Hernandez says her pain started around 3 am and has waxed and waned. At its worse her pain is around a 6 but she rates it now as a 4-5. She complains of mid upper epigastric pain and denies fever, low back pain or lower abd pain. She has had no vomiting or diarrhea. She did note a lot of belching this morning when she first got up and tells me her pain seemed worse an hour after eating today. She ate potato soup for supper last night. She denies dysuria or pelvic pain. She ate some ramen noodles around noon today. Her pain is not associated with movement or position.    Past Medical History:   Diagnosis Date    Abnormal Pap smear of cervix          Past Surgical History:   Procedure Laterality Date     SECTION         Family History   Problem Relation Age of Onset  Stroke Maternal Grandfather     Stroke Maternal Grandmother     Stroke Paternal Uncle     Stroke Paternal Aunt     Cancer Neg Hx     Diabetes Neg Hx     Heart Attack Neg Hx        Social History     Tobacco Use    Smoking status: Never Smoker    Smokeless tobacco: Never Used   Substance Use Topics    Alcohol use: Yes     Alcohol/week: 0.0 standard drinks     Comment: rare      Current Outpatient Medications   Medication Sig Dispense Refill    omeprazole (PRILOSEC) 20 MG delayed release capsule Take 1 capsule by mouth every morning (before breakfast) 30 capsule 0    cyclobenzaprine (FLEXERIL) 5 MG tablet TAKE 1 TABLET BY MOUTH 3 TIMES A DAY AS NEEDED FOR MUSCLE SPASMS 30 tablet 2    diclofenac (VOLTAREN) 50 MG EC tablet Take 1 tablet by mouth 3 times daily (with meals) 60 tablet 3    cetirizine (ZYRTEC) 10 MG tablet Take 10 mg by mouth daily      albuterol sulfate HFA (PROVENTIL HFA) 108 (90 Base) MCG/ACT inhaler Inhale 2 puffs into the lungs every 6 hours as needed for Wheezing (Patient not taking: Reported on 2/10/2021) 1 Inhaler 3    diclofenac sodium (VOLTAREN) 1 % GEL Apply 4 g topically 4 times daily 2 Tube 0     No current facility-administered medications for this visit. No Known Allergies    Health Maintenance   Topic Date Due    Hepatitis C screen  1986    Varicella vaccine (1 of 2 - 2-dose childhood series) 06/03/1987    HIV screen  06/03/2001    Flu vaccine (1) 09/01/2020    Cervical cancer screen  05/18/2023    DTaP/Tdap/Td vaccine (2 - Td) 05/15/2027    Hepatitis A vaccine  Aged Out    Hepatitis B vaccine  Aged Out    Hib vaccine  Aged Out    Meningococcal (ACWY) vaccine  Aged Out    Pneumococcal 0-64 years Vaccine  Aged Out       Subjective:     Review of Systems   Constitutional: Negative for activity change, appetite change, chills and fever. Eyes: Negative. Respiratory: Negative. Negative for cough and shortness of breath. Cardiovascular: Negative. Negative for chest pain. Gastrointestinal: Negative for abdominal pain, bowel incontinence, diarrhea, nausea and vomiting. Mid upper epigastric pain   Genitourinary: Positive for flank pain. Negative for bladder incontinence, dysuria, pelvic pain, vaginal bleeding, vaginal discharge and vaginal pain. Right flank that radiates around to mid upper back pain   Musculoskeletal: Negative for arthralgias and myalgias. Skin: Negative for rash. Neurological: Negative for tingling, headaches and paresthesias.       :Objective      Physical Exam  Vitals signs and nursing note reviewed. Constitutional:       General: She is awake. She is not in acute distress. Appearance: Normal appearance. She is well-developed and well-groomed. She is obese. She is not ill-appearing or toxic-appearing. HENT:      Head: Normocephalic. Right Ear: Hearing and external ear normal.      Left Ear: Hearing and external ear normal.      Nose: Nose normal.      Mouth/Throat:      Lips: Pink. Mouth: Mucous membranes are moist.      Pharynx: Oropharynx is clear. Uvula midline. Tonsils: 0 on the right. 0 on the left. Eyes:      General: Vision grossly intact. Neck:      Musculoskeletal: Full passive range of motion without pain and neck supple. Trachea: Phonation normal.   Cardiovascular:      Rate and Rhythm: Normal rate and regular rhythm. Heart sounds: Normal heart sounds, S1 normal and S2 normal. No murmur. No friction rub. No gallop. Pulmonary:      Effort: Pulmonary effort is normal. No respiratory distress. Breath sounds: Normal breath sounds and air entry. No wheezing, rhonchi or rales. Abdominal:      General: Abdomen is flat. Bowel sounds are normal. There is no distension. Palpations: Abdomen is soft. Tenderness: There is no abdominal tenderness. There is no right CVA tenderness, left CVA tenderness, guarding or rebound. Musculoskeletal:         General: No tenderness or deformity. Skin:     General: Skin is warm and dry. Capillary Refill: Capillary refill takes less than 2 seconds. Neurological:      General: No focal deficit present. Mental Status: She is alert, oriented to person, place, and time and easily aroused. Mental status is at baseline. Psychiatric:         Attention and Perception: Attention normal.         Mood and Affect: Mood normal.         Speech: Speech normal.         Behavior: Behavior normal. Behavior is cooperative. /76   Pulse 78   Temp 98.5 °F (36.9 °C)   Resp 18   Ht 5' 10\" (1.778 m)   Wt 266 lb 12.8 oz (121 kg)   LMP 02/10/2021   SpO2 99%   BMI 38.28 kg/m²     :Assessment       Diagnosis Orders   1. Flank pain  POCT Urinalysis no Micro   2. Acute epigastric pain         :Plan      Orders Placed This Encounter   Procedures    POCT Urinalysis no Micro     Results for orders placed or performed in visit on 02/10/21   POCT Urinalysis no Micro   Result Value Ref Range    Color, UA yellow     Clarity, UA clear     Glucose, UA POC negative     Bilirubin, UA negative     Ketones, UA negative     Spec Grav, UA 1.025     Blood, UA POC trace-lysed (A)     pH, UA 5.5     Protein, UA POC negative     Urobilinogen, UA 0.2     Leukocytes, UA negative     Nitrite, UA negative     Appearance, Fluid       Spoke with pt and discussed with her that she may be having gall bladder issues given her symptoms and exam. She is advised to eat a bland diet and drink plenty of fluids She does have blood in her urine but is on her period so this is expected. We discussed if her pain worsens and she has fever, nausea, vomiting, diarrhea or worsening pain she needs to go to ER. She may need a RUQ US if pain persists and this was discussed and she agrees to plan of care and voices understanding. No follow-ups on file.     Orders Placed This Encounter   Medications  omeprazole (PRILOSEC) 20 MG delayed release capsule     Sig: Take 1 capsule by mouth every morning (before breakfast)     Dispense:  30 capsule     Refill:  0        Patient Instructions     Drink plenty of water  Delta diet- avoid rich, spicy, greasy foods- avoid fresh fruits and vegetables  OTC Tylenol or Motrin as directed for pain  Prilosec daily as directed   If pain worsens or you have nausea, vomiting, fever, worsening pain go to ER  Follow-up here or with PCP as needed  Patient Education        Flank Pain: Care Instructions  Your Care Instructions  Flank pain is pain on the side of the back just below the rib cage and above the waist. It can be on one or both sides. Flank pain has many possible causes, including a kidney stone, a urinary tract infection, or back strain. Flank pain may get better on its own. But don't ignore new symptoms, such as fever, nausea and vomiting, urination problems, pain that gets worse, and dizziness. These may be signs of a more serious problem. You may have to have tests or other treatment. Follow-up care is a key part of your treatment and safety. Be sure to make and go to all appointments, and call your doctor if you are having problems. It's also a good idea to know your test results and keep a list of the medicines you take. How can you care for yourself at home? · Rest until you feel better. · Take pain medicines exactly as directed. ? If the doctor gave you a prescription medicine for pain, take it as prescribed. ? If you are not taking a prescription pain medicine, ask your doctor if you can take an over-the-counter pain medicine, such as acetaminophen (Tylenol), ibuprofen (Advil, Motrin), or naproxen (Aleve). Read and follow all instructions on the label. · If your doctor prescribed antibiotics, take them as directed. Do not stop taking them just because you feel better. You need to take the full course of antibiotics. · To apply heat, put a warm water bottle, a heating pad set on low, or a warm cloth on the painful area. Do not go to sleep with a heating pad on your skin. · To prevent dehydration, drink plenty of fluids, enough so that your urine is light yellow or clear like water. Choose water and other caffeine-free clear liquids until you feel better. If you have kidney, heart, or liver disease and have to limit fluids, talk with your doctor before you increase the amount of fluids you drink. When should you call for help? Call your doctor now or seek immediate medical care if:    · Your flank pain gets worse.     · You have new symptoms, such as fever, nausea, or vomiting.     · You have symptoms of a urinary problem. For example:  ? You have blood or pus in your urine. ? You have chills or body aches. ? It hurts to urinate. ? You have groin or belly pain. Watch closely for changes in your health, and be sure to contact your doctor if you do not get better as expected. Where can you learn more? Go to https://[employer] org and sign in to your [employer] account. Enter S191 in the [facility] box to learn more about \"Flank Pain: Care Instructions. \"     If you do not have an account, please click on the \"Sign Up Now\" link. Current as of: June 26, 2019               Content Version: 12.6  © 5680-2768 [employer]. Care instructions adapted under license by [facility] ([facility]). If you have questions about a medical condition or this instruction, always ask your healthcare professional. [address] [address] any warranty or liability for your use of this information. Patient given educational materials- see patient instructions. Discussed use, benefit, and side effects of prescribedmedications. All patient questions answered. Pt voiced understanding.        Electronically signed by RODERICK Payan CNP on 2/10/2021 at 3:36 PM

## 2021-02-10 NOTE — PATIENT INSTRUCTIONS
· To prevent dehydration, drink plenty of fluids, enough so that your urine is light yellow or clear like water. Choose water and other caffeine-free clear liquids until you feel better. If you have kidney, heart, or liver disease and have to limit fluids, talk with your doctor before you increase the amount of fluids you drink. When should you call for help? Call your doctor now or seek immediate medical care if:    · Your flank pain gets worse.     · You have new symptoms, such as fever, nausea, or vomiting.     · You have symptoms of a urinary problem. For example:  ? You have blood or pus in your urine. ? You have chills or body aches. ? It hurts to urinate. ? You have groin or belly pain. Watch closely for changes in your health, and be sure to contact your doctor if you do not get better as expected. Where can you learn more? Go to https://[employer]. org and sign in to your [employer] account. Enter S191 in the [employer] box to learn more about \"Flank Pain: Care Instructions. \"     If you do not have an account, please click on the \"Sign Up Now\" link. Current as of: June 26, 2019               Content Version: 12.6  © 5650-9180 [employer]. Care instructions adapted under license by [facility] [employer] [facility] ([facility]). If you have questions about a medical condition or this instruction, always ask your healthcare professional. [address] [address] any warranty or liability for your use of this information.

## 2021-05-13 ENCOUNTER — OFFICE VISIT (OUTPATIENT)
Dept: OBGYN CLINIC | Age: 36
End: 2021-05-13
Payer: COMMERCIAL

## 2021-05-13 VITALS
SYSTOLIC BLOOD PRESSURE: 130 MMHG | DIASTOLIC BLOOD PRESSURE: 82 MMHG | BODY MASS INDEX: 36.36 KG/M2 | HEIGHT: 70 IN | WEIGHT: 254 LBS

## 2021-05-13 DIAGNOSIS — Z01.419 ENCOUNTER FOR ANNUAL ROUTINE GYNECOLOGICAL EXAMINATION: Primary | ICD-10-CM

## 2021-05-13 DIAGNOSIS — Z12.31 ENCOUNTER FOR SCREENING MAMMOGRAM FOR BREAST CANCER: ICD-10-CM

## 2021-05-13 DIAGNOSIS — N85.2 UTERINE ENLARGEMENT: ICD-10-CM

## 2021-05-13 DIAGNOSIS — Z11.51 SCREENING FOR HPV (HUMAN PAPILLOMAVIRUS): ICD-10-CM

## 2021-05-13 DIAGNOSIS — Z12.4 SCREENING FOR CERVICAL CANCER: ICD-10-CM

## 2021-05-13 PROCEDURE — 99395 PREV VISIT EST AGE 18-39: CPT | Performed by: OBSTETRICS & GYNECOLOGY

## 2021-05-13 ASSESSMENT — ENCOUNTER SYMPTOMS
GASTROINTESTINAL NEGATIVE: 1
RESPIRATORY NEGATIVE: 1
ALLERGIC/IMMUNOLOGIC NEGATIVE: 1
EYES NEGATIVE: 1

## 2021-05-13 NOTE — PROGRESS NOTES
Pt is here for breast exam and pap smear. Last mammogram:  na  Last pap smear:  2018  Contraception:    :  1  Para:  1  AB:  0  Last bone density:  na  Last colonoscopy:   na    GYN:  .

## 2021-05-19 LAB
HPV COMMENT: NORMAL
HPV TYPE 16: NOT DETECTED
HPV TYPE 18: NOT DETECTED
HPVOH (OTHER TYPES): NOT DETECTED

## 2021-06-02 ENCOUNTER — HOSPITAL ENCOUNTER (OUTPATIENT)
Dept: ULTRASOUND IMAGING | Age: 36
Discharge: HOME | End: 2021-06-02
Payer: COMMERCIAL

## 2021-06-02 DIAGNOSIS — N85.2 UTERINE ENLARGEMENT: ICD-10-CM

## 2021-06-02 PROCEDURE — 76856 US EXAM PELVIC COMPLETE: CPT

## 2021-07-23 LAB
CHOLESTEROL, TOTAL: 154 MG/DL (ref 160–199)
GLUCOSE BLD-MCNC: 107 MG/DL (ref 74–109)
HDLC SERPL-MCNC: 40 MG/DL (ref 65–121)
LDL CHOLESTEROL CALCULATED: 101 MG/DL
TRIGL SERPL-MCNC: 67 MG/DL (ref 0–149)

## 2021-09-16 ENCOUNTER — OFFICE VISIT (OUTPATIENT)
Dept: PRIMARY CARE CLINIC | Age: 36
End: 2021-09-16
Payer: COMMERCIAL

## 2021-09-16 VITALS
DIASTOLIC BLOOD PRESSURE: 72 MMHG | OXYGEN SATURATION: 96 % | BODY MASS INDEX: 34.53 KG/M2 | TEMPERATURE: 97.2 F | WEIGHT: 241.2 LBS | HEIGHT: 70 IN | SYSTOLIC BLOOD PRESSURE: 116 MMHG | HEART RATE: 96 BPM

## 2021-09-16 DIAGNOSIS — G89.29 CHRONIC JAW PAIN: ICD-10-CM

## 2021-09-16 DIAGNOSIS — Z00.00 ENCOUNTER FOR ANNUAL PHYSICAL EXAM: Primary | ICD-10-CM

## 2021-09-16 DIAGNOSIS — R68.84 CHRONIC JAW PAIN: ICD-10-CM

## 2021-09-16 DIAGNOSIS — M54.50 ACUTE BILATERAL LOW BACK PAIN WITHOUT SCIATICA: ICD-10-CM

## 2021-09-16 PROCEDURE — 99395 PREV VISIT EST AGE 18-39: CPT | Performed by: NURSE PRACTITIONER

## 2021-09-16 RX ORDER — CYCLOBENZAPRINE HCL 5 MG
TABLET ORAL
Qty: 30 TABLET | Refills: 1 | Status: SHIPPED | OUTPATIENT
Start: 2021-09-16 | End: 2022-02-28 | Stop reason: SDUPTHER

## 2021-09-16 ASSESSMENT — ENCOUNTER SYMPTOMS
VOMITING: 0
COUGH: 0
PHOTOPHOBIA: 0
SHORTNESS OF BREATH: 0
RHINORRHEA: 0
COLOR CHANGE: 0
VOICE CHANGE: 0
BACK PAIN: 0
NAUSEA: 0

## 2021-09-16 ASSESSMENT — PATIENT HEALTH QUESTIONNAIRE - PHQ9
SUM OF ALL RESPONSES TO PHQ9 QUESTIONS 1 & 2: 0
1. LITTLE INTEREST OR PLEASURE IN DOING THINGS: 0
SUM OF ALL RESPONSES TO PHQ QUESTIONS 1-9: 0
2. FEELING DOWN, DEPRESSED OR HOPELESS: 0

## 2021-09-16 NOTE — PROGRESS NOTES
[address] [facility]  [address] [address] [place] [address]  Dept: [phone]  Dept Fax: [phone]  Loc: [phone]    Margaret Matson is a 29 y.o. female who presents today for her medical conditions/complaints as noted below. Margaret Matson is c/o of Establish Care and Medication Refill (needs medication refilled)        HPI:     HPI   Chief Complaint   Patient presents with    Establish Care    Medication Refill     needs medication refilled     Patient presents today for annual physical exam. She reports she is doing well. She uses diclofenac and flexeril PRN for back pain. No other medications. She has had be well within screen; labs reviewed and in normal range. She is up to date on pap/vaccines. Past Medical History:   Diagnosis Date    Abnormal Pap smear of cervix           Past Surgical History:   Procedure Laterality Date     SECTION         Vitals 2021 2021 2/10/2021 2020 2020 8275   SYSTOLIC 324 947 317 - - -   DIASTOLIC 72 82 76 - - -   Pulse 96 - 78 90 87 -   Temp 97.2 - 98.5 98.6 97.8 -   Resp - - 18 - - -   SpO2 96 - 99 97 98 -   Weight 241 lb 3.2 oz 254 lb 266 lb 12.8 oz - - 255 lb   Height 5' 10\" 5' 10\" 5' 10\" - - -   Body mass index 34.6 kg/m2 36.44 kg/m2 38.28 kg/m2 - - -   Waist (Inches) - - - - - 48   Some recent data might be hidden       Family History   Problem Relation Age of Onset    Stroke Maternal Grandfather     Stroke Maternal Grandmother     Stroke Paternal Uncle     Stroke Paternal Aunt     Cancer Neg Hx     Diabetes Neg Hx     Heart Attack Neg Hx        Social History     Tobacco Use    Smoking status: Never Smoker    Smokeless tobacco: Never Used   Substance Use Topics    Alcohol use:  Yes     Alcohol/week: 0.0 standard drinks     Comment: rare      Current Outpatient Medications on File Prior to Visit   Medication Sig Dispense Refill    cetirizine (ZYRTEC) 10 MG tablet Take 10 mg by mouth daily (Patient not taking: Reported on 9/16/2021)       No current facility-administered medications on file prior to visit. No Known Allergies    Health Maintenance   Topic Date Due    Hepatitis C screen  Never done    Varicella vaccine (1 of 2 - 2-dose childhood series) Never done    HIV screen  Never done    Flu vaccine (1) 09/01/2021    Cervical cancer screen  05/13/2026    DTaP/Tdap/Td vaccine (2 - Td or Tdap) 05/15/2027    COVID-19 Vaccine  Completed    Hepatitis A vaccine  Aged Out    Hepatitis B vaccine  Aged Out    Hib vaccine  Aged Out    Meningococcal (ACWY) vaccine  Aged Out    Pneumococcal 0-64 years Vaccine  Aged Out       Subjective:      Review of Systems   Constitutional: Negative for chills and fever. HENT: Negative for ear pain, hearing loss, rhinorrhea and voice change. Eyes: Negative for photophobia and visual disturbance. Respiratory: Negative for cough and shortness of breath. Cardiovascular: Negative for chest pain and palpitations. Gastrointestinal: Negative for nausea and vomiting. Endocrine: Negative. Negative for cold intolerance and heat intolerance. Genitourinary: Negative for difficulty urinating and flank pain. Musculoskeletal: Negative for back pain and neck pain. Skin: Negative for color change and rash. Allergic/Immunologic: Negative for environmental allergies and food allergies. Neurological: Negative for dizziness, speech difficulty and headaches. Hematological: Does not bruise/bleed easily. Psychiatric/Behavioral: Negative for sleep disturbance and suicidal ideas. Objective:     Physical Exam  Vitals and nursing note reviewed. Constitutional:       Appearance: She is well-developed. HENT:      Head: Atraumatic.       Right Ear: External ear normal.      Left Ear: External ear normal.      Nose: Nose normal.   Eyes:      Conjunctiva/sclera: Conjunctivae normal.      Pupils: Pupils are equal, round, and reactive to light. Cardiovascular:      Rate and Rhythm: Normal rate and regular rhythm. Heart sounds: Normal heart sounds, S1 normal and S2 normal.   Pulmonary:      Effort: Pulmonary effort is normal.      Breath sounds: Normal breath sounds. Abdominal:      General: Bowel sounds are normal.      Palpations: Abdomen is soft. Musculoskeletal:         General: Normal range of motion. Cervical back: Normal range of motion and neck supple. Skin:     General: Skin is warm and dry. Neurological:      Mental Status: She is alert and oriented to person, place, and time. Psychiatric:         Behavior: Behavior normal.       /72   Pulse 96   Temp 97.2 °F (36.2 °C) (Temporal)   Ht 5' 10\" (1.778 m)   Wt 241 lb 3.2 oz (109.4 kg)   SpO2 96%   BMI 34.61 kg/m²     Assessment:       Diagnosis Orders   1. Encounter for annual physical exam     2. Acute bilateral low back pain without sciatica  diclofenac (VOLTAREN) 50 MG EC tablet    cyclobenzaprine (FLEXERIL) 5 MG tablet   3. Chronic jaw pain  cyclobenzaprine (FLEXERIL) 5 MG tablet         Plan:     Follow-up one year for annual physical.      Patient given educational materials -see patient instructions. Discussed use, benefit, and side effects of prescribed medications. All patient questions answered. Pt voiced understanding. Reviewed health maintenance. Instructed to continue currentmedications, diet and exercise. Patient agreed with treatment plan. Follow up as directed. MEDICATIONS:  Orders Placed This Encounter   Medications    diclofenac (VOLTAREN) 50 MG EC tablet     Sig: Take 1 tablet by mouth 3 times daily (with meals)     Dispense:  30 tablet     Refill:  1    cyclobenzaprine (FLEXERIL) 5 MG tablet     Sig: TAKE 1 TABLET BY MOUTH 3 TIMES A DAY AS NEEDED FOR MUSCLE SPASMS     Dispense:  30 tablet     Refill:  1         ORDERS:  No orders of the defined types were placed in this encounter.       Follow-up:  Return in about 1 year (around 9/16/2022) for physical, in office. PATIENT INSTRUCTIONS:  There are no Patient Instructions on file for this visit. Electronically signed by RODERICK Nguyen on 9/16/2021 at 11:50 AM    EMR Dragon/transcription disclaimer:  Much of thisencounter note is electronic transcription/translation of spoken language to printed texts. The electronic translation of spoken language may be erroneous, or at times, nonsensical words or phrases may be inadvertentlytranscribed.   Although I have reviewed the note for such errors, some may still exist.

## 2022-02-28 ENCOUNTER — OFFICE VISIT (OUTPATIENT)
Dept: PRIMARY CARE CLINIC | Age: 37
End: 2022-02-28
Payer: COMMERCIAL

## 2022-02-28 ENCOUNTER — HOSPITAL ENCOUNTER (OUTPATIENT)
Dept: GENERAL RADIOLOGY | Age: 37
Discharge: HOME | End: 2022-02-28
Payer: COMMERCIAL

## 2022-02-28 VITALS
BODY MASS INDEX: 34.79 KG/M2 | WEIGHT: 243 LBS | OXYGEN SATURATION: 99 % | TEMPERATURE: 98.1 F | HEART RATE: 82 BPM | HEIGHT: 70 IN | DIASTOLIC BLOOD PRESSURE: 68 MMHG | SYSTOLIC BLOOD PRESSURE: 130 MMHG

## 2022-02-28 DIAGNOSIS — M54.50 LUMBAR PAIN: ICD-10-CM

## 2022-02-28 DIAGNOSIS — R68.84 CHRONIC JAW PAIN: ICD-10-CM

## 2022-02-28 DIAGNOSIS — M54.50 ACUTE BILATERAL LOW BACK PAIN WITHOUT SCIATICA: ICD-10-CM

## 2022-02-28 DIAGNOSIS — G89.29 CHRONIC JAW PAIN: ICD-10-CM

## 2022-02-28 DIAGNOSIS — M54.50 LUMBAR PAIN: Primary | ICD-10-CM

## 2022-02-28 PROCEDURE — 99213 OFFICE O/P EST LOW 20 MIN: CPT | Performed by: NURSE PRACTITIONER

## 2022-02-28 PROCEDURE — 72100 X-RAY EXAM L-S SPINE 2/3 VWS: CPT

## 2022-02-28 RX ORDER — METHYLPREDNISOLONE 4 MG/1
TABLET ORAL
Qty: 1 KIT | Refills: 0 | Status: SHIPPED | OUTPATIENT
Start: 2022-02-28 | End: 2022-07-19 | Stop reason: SDUPTHER

## 2022-02-28 RX ORDER — CYCLOBENZAPRINE HCL 5 MG
TABLET ORAL
Qty: 30 TABLET | Refills: 5 | Status: SHIPPED | OUTPATIENT
Start: 2022-02-28 | End: 2022-06-13 | Stop reason: SDUPTHER

## 2022-02-28 SDOH — ECONOMIC STABILITY: FOOD INSECURITY: WITHIN THE PAST 12 MONTHS, YOU WORRIED THAT YOUR FOOD WOULD RUN OUT BEFORE YOU GOT MONEY TO BUY MORE.: NEVER TRUE

## 2022-02-28 SDOH — ECONOMIC STABILITY: FOOD INSECURITY: WITHIN THE PAST 12 MONTHS, THE FOOD YOU BOUGHT JUST DIDN'T LAST AND YOU DIDN'T HAVE MONEY TO GET MORE.: NEVER TRUE

## 2022-02-28 ASSESSMENT — ENCOUNTER SYMPTOMS
RHINORRHEA: 0
COUGH: 0
COLOR CHANGE: 0
VOMITING: 0
PHOTOPHOBIA: 0
SHORTNESS OF BREATH: 0
VOICE CHANGE: 0
NAUSEA: 0
BACK PAIN: 1

## 2022-02-28 ASSESSMENT — SOCIAL DETERMINANTS OF HEALTH (SDOH): HOW HARD IS IT FOR YOU TO PAY FOR THE VERY BASICS LIKE FOOD, HOUSING, MEDICAL CARE, AND HEATING?: NOT HARD AT ALL

## 2022-02-28 NOTE — PROGRESS NOTES
[address] [facility]  [address] [address]  [address] [address]  Dept: [phone]  Dept Fax: [phone]  Loc: [phone]    Miya Nichols is a 29 y.o. female who presents today for her medical conditions/complaints as noted below. Miya Nichols is c/o of Back Pain (pt states started mid october consistant since and really bad for 2weeks )        HPI:     HPI   Chief Complaint   Patient presents with    Back Pain     pt states started mid october consistant since and really bad for 2weeks      Patient presents today for evaluation of back pain. She reports it has been ongoing for 5 months but the last 2 weeks has worsened. She has tried diclofenac, flexeril, stretching and inversion table. She denies any known injury. She has had xrays at chiropractor years ago she states. She states pain is bilateral lumbar region; often radiates to lower extremities; more so right side than left side. She has not consistently been taking an anti-inflammatory.      Past Medical History:   Diagnosis Date    Abnormal Pap smear of cervix           Past Surgical History:   Procedure Laterality Date     SECTION         Vitals 2022 2021 2021 2/10/2021 2020    SYSTOLIC [id number] 049 - -   DIASTOLIC 68 72 82 76 - -   Site Left Upper Arm - - - - -   Position Sitting - - - - -   Pulse 82 96 - 78 90 87   Temp 98.1 97.2 - 98.5 98.6 97.8   Resp - - - 18 - -   SpO2 99 96 - 99 97 98   Weight 243 lb 241 lb 3.2 oz 254 lb 266 lb 12.8 oz - -   Height 5' 10\" 5' 10\" 5' 10\" 5' 10\" - -   Body mass index 34.86 kg/m2 34.6 kg/m2 36.44 kg/m2 38.28 kg/m2 - -   Waist (Inches) - - - - - -   Some recent data might be hidden       Family History   Problem Relation Age of Onset    Stroke Maternal Grandfather     Stroke Maternal Grandmother     Stroke Paternal Uncle     Stroke Paternal Aunt     Cancer Neg Hx     Diabetes Neg Hx     Heart Attack Neg Hx Social History     Tobacco Use    Smoking status: Never Smoker    Smokeless tobacco: Never Used   Substance Use Topics    Alcohol use: Yes     Alcohol/week: 0.0 standard drinks     Comment: rare      Current Outpatient Medications on File Prior to Visit   Medication Sig Dispense Refill    diclofenac (VOLTAREN) 50 MG EC tablet Take 1 tablet by mouth 3 times daily (with meals) 30 tablet 1    cetirizine (ZYRTEC) 10 MG tablet Take 10 mg by mouth daily        No current facility-administered medications on file prior to visit. No Known Allergies    Health Maintenance   Topic Date Due    Hepatitis C screen  Never done    Varicella vaccine (1 of 2 - 2-dose childhood series) Never done    HIV screen  Never done    Diabetes screen  06/03/2021    COVID-19 Vaccine (3 - Booster for Pfizer series) 07/12/2021    Flu vaccine (1) 09/01/2021    Depression Screen  09/16/2022    Cervical cancer screen  05/13/2026    DTaP/Tdap/Td vaccine (2 - Td or Tdap) 05/15/2027    Hepatitis A vaccine  Aged Out    Hepatitis B vaccine  Aged Out    Hib vaccine  Aged Out    Meningococcal (ACWY) vaccine  Aged Out    Pneumococcal 0-64 years Vaccine  Aged Out       Subjective:      Review of Systems   Constitutional: Negative for chills and fever. HENT: Negative for ear pain, hearing loss, rhinorrhea and voice change. Eyes: Negative for photophobia and visual disturbance. Respiratory: Negative for cough and shortness of breath. Cardiovascular: Negative for chest pain and palpitations. Gastrointestinal: Negative for nausea and vomiting. Endocrine: Negative. Negative for cold intolerance and heat intolerance. Genitourinary: Negative for difficulty urinating and flank pain. Musculoskeletal: Positive for back pain. Negative for neck pain. Skin: Negative for color change and rash. Allergic/Immunologic: Negative for environmental allergies and food allergies.    Neurological: Negative for dizziness, speech difficulty and headaches. Hematological: Does not bruise/bleed easily. Psychiatric/Behavioral: Negative for sleep disturbance and suicidal ideas. Objective:     Physical Exam  Vitals and nursing note reviewed. Constitutional:       Appearance: She is well-developed. HENT:      Head: Atraumatic. Right Ear: External ear normal.      Left Ear: External ear normal.      Nose: Nose normal.   Eyes:      Conjunctiva/sclera: Conjunctivae normal.      Pupils: Pupils are equal, round, and reactive to light. Cardiovascular:      Rate and Rhythm: Normal rate and regular rhythm. Heart sounds: Normal heart sounds, S1 normal and S2 normal.   Pulmonary:      Effort: Pulmonary effort is normal.      Breath sounds: Normal breath sounds. Abdominal:      General: Bowel sounds are normal.      Palpations: Abdomen is soft. Musculoskeletal:         General: Normal range of motion. Cervical back: Normal range of motion and neck supple. Skin:     General: Skin is warm and dry. Neurological:      Mental Status: She is alert and oriented to person, place, and time. Psychiatric:         Behavior: Behavior normal.       /68 (Site: Left Upper Arm, Position: Sitting)   Pulse 82   Temp 98.1 °F (36.7 °C) (Temporal)   Ht 5' 10\" (1.778 m)   Wt 243 lb (110.2 kg)   SpO2 99%   BMI 34.87 kg/m²     Assessment:       Diagnosis Orders   1. Lumbar pain  XR LUMBAR SPINE (2-3 VIEWS)   2. Acute bilateral low back pain without sciatica  cyclobenzaprine (FLEXERIL) 5 MG tablet   3. Chronic jaw pain  cyclobenzaprine (FLEXERIL) 5 MG tablet         Plan:     Xray lumbar spine. Flexeril and medrol dose pack as directed. PDMP Monitoring:    Last PDMP Daniel as Reviewed [facility]):  Review User Review Instant Review Result            Urine Drug Screenings (1 yr)    No resulted procedures found.        Medication Contract and Consent for Opioid Use Documents Filed      No documents found                 Patient given educational materials -see patient instructions. Discussed use, benefit, and side effects of prescribed medications. All patient questions answered. Pt voiced understanding. Reviewed health maintenance. Instructed to continue currentmedications, diet and exercise. Patient agreed with treatment plan. Follow up as directed. MEDICATIONS:  Orders Placed This Encounter   Medications    cyclobenzaprine (FLEXERIL) 5 MG tablet     Sig: TAKE 1 TABLET BY MOUTH 3 TIMES A DAY AS NEEDED FOR MUSCLE SPASMS     Dispense:  30 tablet     Refill:  5    methylPREDNISolone (MEDROL DOSEPACK) 4 MG tablet     Sig: Take by mouth. Dispense:  1 kit     Refill:  0         ORDERS:  Orders Placed This Encounter   Procedures    XR LUMBAR SPINE (2-3 VIEWS)       Follow-up:  No follow-ups on file. PATIENT INSTRUCTIONS:  There are no Patient Instructions on file for this visit. Electronically signed by RODERICK Milan on 3/22/2022 at 1:21 PM    EMR Dragon/transcription disclaimer:  Much of thisencounter note is electronic transcription/translation of spoken language to printed texts. The electronic translation of spoken language may be erroneous, or at times, nonsensical words or phrases may be inadvertentlytranscribed.   Although I have reviewed the note for such errors, some may still exist.

## 2022-03-01 ENCOUNTER — TELEPHONE (OUTPATIENT)
Dept: PRIMARY CARE CLINIC | Age: 37
End: 2022-03-01

## 2022-03-01 NOTE — TELEPHONE ENCOUNTER
----- Message from RODERICK Reynolds sent at 3/1/2022  1:29 PM CST -----  Please inform patient that xray shows disc narrowing to L5-S1 moderate to severely. Okay to order MRI for further evaluation.

## 2022-04-12 LAB
CHOLESTEROL, TOTAL: 190 MG/DL (ref 160–199)
GLUCOSE BLD-MCNC: 109 MG/DL (ref 74–109)
HDLC SERPL-MCNC: 48 MG/DL (ref 65–121)
LDL CHOLESTEROL CALCULATED: 122 MG/DL
TRIGL SERPL-MCNC: 99 MG/DL (ref 0–149)

## 2022-06-13 DIAGNOSIS — M54.50 ACUTE BILATERAL LOW BACK PAIN WITHOUT SCIATICA: ICD-10-CM

## 2022-06-13 DIAGNOSIS — G89.29 CHRONIC JAW PAIN: ICD-10-CM

## 2022-06-13 DIAGNOSIS — R68.84 CHRONIC JAW PAIN: ICD-10-CM

## 2022-06-13 RX ORDER — CYCLOBENZAPRINE HCL 5 MG
TABLET ORAL
Qty: 30 TABLET | Refills: 5 | Status: SHIPPED | OUTPATIENT
Start: 2022-06-13

## 2022-07-19 ENCOUNTER — OFFICE VISIT (OUTPATIENT)
Dept: PRIMARY CARE CLINIC | Age: 37
End: 2022-07-19
Payer: COMMERCIAL

## 2022-07-19 VITALS
WEIGHT: 241.2 LBS | SYSTOLIC BLOOD PRESSURE: 118 MMHG | BODY MASS INDEX: 34.53 KG/M2 | HEIGHT: 70 IN | DIASTOLIC BLOOD PRESSURE: 78 MMHG | OXYGEN SATURATION: 98 % | HEART RATE: 74 BPM

## 2022-07-19 DIAGNOSIS — M54.50 ACUTE BILATERAL LOW BACK PAIN WITHOUT SCIATICA: ICD-10-CM

## 2022-07-19 DIAGNOSIS — M54.50 LUMBAR PAIN: Primary | ICD-10-CM

## 2022-07-19 PROCEDURE — 99213 OFFICE O/P EST LOW 20 MIN: CPT | Performed by: INTERNAL MEDICINE

## 2022-07-19 RX ORDER — METHYLPREDNISOLONE 4 MG/1
TABLET ORAL
Qty: 1 KIT | Refills: 0 | Status: SHIPPED | OUTPATIENT
Start: 2022-07-19 | End: 2022-08-09

## 2022-07-19 ASSESSMENT — PATIENT HEALTH QUESTIONNAIRE - PHQ9
SUM OF ALL RESPONSES TO PHQ QUESTIONS 1-9: 0
1. LITTLE INTEREST OR PLEASURE IN DOING THINGS: 0
SUM OF ALL RESPONSES TO PHQ QUESTIONS 1-9: 0
SUM OF ALL RESPONSES TO PHQ QUESTIONS 1-9: 0
2. FEELING DOWN, DEPRESSED OR HOPELESS: 0
SUM OF ALL RESPONSES TO PHQ QUESTIONS 1-9: 0
SUM OF ALL RESPONSES TO PHQ9 QUESTIONS 1 & 2: 0

## 2022-07-19 ASSESSMENT — ENCOUNTER SYMPTOMS
RESPIRATORY NEGATIVE: 1
CONSTIPATION: 0
BACK PAIN: 1

## 2022-07-20 NOTE — ASSESSMENT & PLAN NOTE
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

## 2022-07-20 NOTE — PROGRESS NOTES
Ines Reyna ( 1985) is a 40 y.o. female, Established, here for evaluation of the following chief complaint(s). 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 PHQ Scores 2022 2021 1/10/2020 3/4/2019 2018   PHQ2 Score 0 0 1 0 0   PHQ9 Score 0 0 1 0 0       Results for orders placed or performed in visit on 22   Be Well Health Screen   Result Value Ref Range    Glucose 109 74 - 109 mg/dL    Cholesterol, Total 190 160 - 199 mg/dL    Triglycerides 99 0 - 149 mg/dL    HDL 48 (L) 65 - 121 mg/dL    LDL Calculated 122 <100 mg/dL       No follow-ups on file. Back Pain  Pertinent negatives include no fever. Review of Systems   Constitutional:  Negative for fatigue and fever. HENT: Negative. Respiratory: Negative. Cardiovascular: Negative. Gastrointestinal:  Negative for constipation. Genitourinary:  Negative for difficulty urinating and flank pain. Musculoskeletal:  Positive for back pain. Physical Exam  Musculoskeletal:      Lumbar back: Spasms present.  No swelling, deformity, lacerations or bony tenderness. Normal range of motion. Positive left straight leg raise test. No scoliosis. Neurological:      Mental Status: She is alert.        (Time Documentation Optional [id number])    An electronic signaturewaas used to authenticate this note  -Debra Fermin MD on 7/19/2022 at 7:12 PM

## 2022-08-09 ENCOUNTER — OFFICE VISIT (OUTPATIENT)
Dept: PRIMARY CARE CLINIC | Age: 37
End: 2022-08-09
Payer: COMMERCIAL

## 2022-08-09 VITALS
TEMPERATURE: 98.2 F | SYSTOLIC BLOOD PRESSURE: 130 MMHG | HEIGHT: 70 IN | DIASTOLIC BLOOD PRESSURE: 74 MMHG | WEIGHT: 238.2 LBS | HEART RATE: 82 BPM | OXYGEN SATURATION: 98 % | BODY MASS INDEX: 34.1 KG/M2

## 2022-08-09 DIAGNOSIS — M54.32 BILATERAL SCIATICA: ICD-10-CM

## 2022-08-09 DIAGNOSIS — M54.31 BILATERAL SCIATICA: ICD-10-CM

## 2022-08-09 DIAGNOSIS — R68.84 CHRONIC JAW PAIN: ICD-10-CM

## 2022-08-09 DIAGNOSIS — M54.50 ACUTE BILATERAL LOW BACK PAIN WITHOUT SCIATICA: ICD-10-CM

## 2022-08-09 DIAGNOSIS — M51.37 DEGENERATIVE DISC DISEASE AT L5-S1 LEVEL: Primary | ICD-10-CM

## 2022-08-09 DIAGNOSIS — R26.81 GAIT INSTABILITY: ICD-10-CM

## 2022-08-09 DIAGNOSIS — G89.29 CHRONIC JAW PAIN: ICD-10-CM

## 2022-08-09 PROCEDURE — 99213 OFFICE O/P EST LOW 20 MIN: CPT | Performed by: NURSE PRACTITIONER

## 2022-08-09 PROCEDURE — 96372 THER/PROPH/DIAG INJ SC/IM: CPT | Performed by: NURSE PRACTITIONER

## 2022-08-09 RX ORDER — DEXAMETHASONE SODIUM PHOSPHATE 10 MG/ML
10 INJECTION INTRAMUSCULAR; INTRAVENOUS ONCE
Status: COMPLETED | OUTPATIENT
Start: 2022-08-09 | End: 2022-08-09

## 2022-08-09 RX ORDER — TIZANIDINE 4 MG/1
4 TABLET ORAL 3 TIMES DAILY PRN
Qty: 30 TABLET | Refills: 0 | Status: SHIPPED | OUTPATIENT
Start: 2022-08-09 | End: 2022-08-31 | Stop reason: SDUPTHER

## 2022-08-09 RX ORDER — MELOXICAM 15 MG/1
15 TABLET ORAL DAILY PRN
Qty: 30 TABLET | Refills: 0 | Status: SHIPPED | OUTPATIENT
Start: 2022-08-09 | End: 2022-08-31 | Stop reason: SDUPTHER

## 2022-08-09 RX ORDER — GABAPENTIN 100 MG/1
100 CAPSULE ORAL 3 TIMES DAILY
Qty: 90 CAPSULE | Refills: 0 | Status: SHIPPED | OUTPATIENT
Start: 2022-08-09 | End: 2022-08-31 | Stop reason: SDUPTHER

## 2022-08-09 RX ADMIN — DEXAMETHASONE SODIUM PHOSPHATE 10 MG: 10 INJECTION INTRAMUSCULAR; INTRAVENOUS at 16:34

## 2022-08-09 ASSESSMENT — ENCOUNTER SYMPTOMS
VOICE CHANGE: 0
NAUSEA: 0
SHORTNESS OF BREATH: 0
RHINORRHEA: 0
PHOTOPHOBIA: 0
COLOR CHANGE: 0
COUGH: 0
VOMITING: 0
BACK PAIN: 1

## 2022-08-09 NOTE — PROGRESS NOTES
[address] [facility]  [address] [address]  [address] [address]  Dept: [phone]  Dept Fax: [phone]  Loc: [phone]    Pauline Castro is a 40 y.o. female who presents today for her medical conditions/complaints as noted below. Pauline Castro is c/o of Muscle Pain and Back Pain (Pt states the pain has been going on for 3wks in  lower back  to her hip area and she states  muscles tight  and claudia down into her legs would like to discuss her cyclobenzaprine dosage )        HPI:     HPI   Chief Complaint   Patient presents with    Muscle Pain    Back Pain     Pt states the pain has been going on for 3wks in  lower back  to her hip area and she states  muscles tight  and claudia down into her legs would like to discuss her cyclobenzaprine dosage      Patient presents today for further evaluation of continued lower back pain and sciatica. She has had continued back pain for several months. This worsened in February when she came in the office to be seen; she was treated with steroids, diclofenac and flexeril and symptoms significantly improved. Approximately 3 weeks ago she returned with worsening symptoms and worsening radiation to lower extremities. She has had xray that showed moderate to severe disc narrowing L5-S1. She states after this visit medications only slightly improved symptoms and they have now returned and are worse.        Past Medical History:   Diagnosis Date    Abnormal Pap smear of cervix           Past Surgical History:   Procedure Laterality Date     SECTION         Vitals 2022 2022 2022 2021 2021 3/65/0517   SYSTOLIC [id number] [phone]   DIASTOLIC 74 78 68 72 82 76   Site - - Left Upper Arm - - -   Position - - Sitting - - -   Pulse 82 74 82 96 - 78   Temp 98.2 - 98.1 97.2 - 98.5   Resp - - - - - 18   SpO2 98 98 99 96 - 99   Weight 238 lb 3.2 oz 241 lb 3.2 oz 243 lb 241 lb 3.2 oz 254 lb 266 lb 12.8 oz   Height 5' 10\" 5' 10\" 5' 10\" 5' 10\" 5' 10\" 5' 10\"   Body mass index 34.17 kg/m2 34.6 kg/m2 34.86 kg/m2 34.6 kg/m2 36.44 kg/m2 38.28 kg/m2   Waist (Inches) - - - - - -   Some recent data might be hidden       Family History   Problem Relation Age of Onset    Stroke Maternal Grandfather     Stroke Maternal Grandmother     Stroke Paternal Uncle     Stroke Paternal Aunt     Cancer Neg Hx     Diabetes Neg Hx     Heart Attack Neg Hx        Social History     Tobacco Use    Smoking status: Never    Smokeless tobacco: Never   Substance Use Topics    Alcohol use: Yes     Alcohol/week: 0.0 standard drinks     Comment: rare      Current Outpatient Medications on File Prior to Visit   Medication Sig Dispense Refill    diclofenac (VOLTAREN) 50 MG EC tablet Take 1 tablet by mouth in the morning and 1 tablet at noon and 1 tablet in the evening. Take with meals. 30 tablet 5    cyclobenzaprine (FLEXERIL) 5 MG tablet TAKE 1 TABLET BY MOUTH 3 TIMES A DAY AS NEEDED FOR MUSCLE SPASMS 30 tablet 5    cetirizine (ZYRTEC) 10 MG tablet Take 10 mg by mouth daily        No current facility-administered medications on file prior to visit. No Known Allergies    Health Maintenance   Topic Date Due    Varicella vaccine (1 of 2 - 2-dose childhood series) Never done    HIV screen  Never done    Hepatitis C screen  Never done    Diabetes screen  06/03/2021    Flu vaccine (1) 09/01/2022    Depression Screen  07/19/2023    Cervical cancer screen  05/13/2026    DTaP/Tdap/Td vaccine (2 - Td or Tdap) 05/15/2027    COVID-19 Vaccine  Completed    Hepatitis A vaccine  Aged Out    Hepatitis B vaccine  Aged Out    Hib vaccine  Aged Out    Meningococcal (ACWY) vaccine  Aged Out    Pneumococcal 0-64 years Vaccine  Aged Out       Subjective:      Review of Systems   Constitutional:  Negative for chills and fever. HENT:  Negative for ear pain, hearing loss, rhinorrhea and voice change. Eyes:  Negative for photophobia and visual disturbance. Respiratory:  Negative for cough and shortness of breath. Cardiovascular:  Negative for chest pain and palpitations. Gastrointestinal:  Negative for nausea and vomiting. Endocrine: Negative. Negative for cold intolerance and heat intolerance. Genitourinary:  Negative for difficulty urinating and flank pain. Musculoskeletal:  Positive for back pain. Negative for neck pain. Skin:  Negative for color change and rash. Allergic/Immunologic: Negative for environmental allergies and food allergies. Neurological:  Negative for dizziness, speech difficulty and headaches. Hematological:  Does not bruise/bleed easily. Psychiatric/Behavioral:  Negative for sleep disturbance and suicidal ideas. Objective:     Physical Exam  Vitals and nursing note reviewed. Constitutional:       Appearance: She is well-developed. HENT:      Head: Atraumatic. Right Ear: External ear normal.      Left Ear: External ear normal.      Nose: Nose normal.   Eyes:      Conjunctiva/sclera: Conjunctivae normal.      Pupils: Pupils are equal, round, and reactive to light. Cardiovascular:      Rate and Rhythm: Normal rate and regular rhythm. Heart sounds: Normal heart sounds, S1 normal and S2 normal.   Pulmonary:      Effort: Pulmonary effort is normal.      Breath sounds: Normal breath sounds. Abdominal:      General: Bowel sounds are normal.      Palpations: Abdomen is soft. Musculoskeletal:         General: Normal range of motion. Cervical back: Normal range of motion and neck supple. Skin:     General: Skin is warm and dry. Neurological:      Mental Status: She is alert and oriented to person, place, and time. Psychiatric:         Behavior: Behavior normal.     /74   Pulse 82   Temp 98.2 °F (36.8 °C) (Temporal)   Ht 5' 10\" (1.778 m)   Wt 238 lb 3.2 oz (108 kg)   SpO2 98%   BMI 34.18 kg/m²     Assessment:       Diagnosis Orders   1.  Degenerative disc disease at L5-S1 level  MRI by mouth in the morning and 1 capsule at noon and 1 capsule before bedtime. Do all this for 90 days. Dispense:  90 capsule     Refill:  0         ORDERS:  Orders Placed This Encounter   Procedures    MRI LUMBAR [address] [place] [address]       Follow-up:  No follow-ups on file. PATIENT INSTRUCTIONS:  Patient Instructions   MRI lumbar spine. Referral to Dr Lisa Davalos- will call with appointment. Begin Gabapentin 100 mg up 3 times daily. Switch diclofenac to meloxicam.   Switch flexeril to tizanidine. Would consider PT if MRI clear. Follow-up for worsening symptoms. Electronically signed by RODERICK Brown on 8/9/2022 at 1:34 PM    EMR Dragon/transcription disclaimer:  Much of thisencounter note is electronic transcription/translation of spoken language to printed texts. The electronic translation of spoken language may be erroneous, or at times, nonsensical words or phrases may be inadvertentlytranscribed.   Although I have reviewed the note for such errors, some may still exist.

## 2022-08-09 NOTE — PATIENT INSTRUCTIONS
MRI lumbar spine. Referral to Dr Van- will call with appointment. Begin Gabapentin 100 mg up 3 times daily. Switch diclofenac to meloxicam.   Switch flexeril to tizanidine. Would consider PT if MRI clear. Follow-up for worsening symptoms.

## 2022-08-10 ENCOUNTER — TELEPHONE (OUTPATIENT)
Dept: NEUROSURGERY | Age: 37
End: 2022-08-10

## 2022-08-15 ENCOUNTER — TELEPHONE (OUTPATIENT)
Dept: NEUROSURGERY | Age: 37
End: 2022-08-15

## 2022-08-15 NOTE — TELEPHONE ENCOUNTER
[facility]    Diagnosis/Reason for Referral?    Degenerative disc disease at L5-S1 level  M54.31, M54.32 (ICD-10-CM) - Bilateral sciatica  R26.81 (ICD-10-CM) - Gait instability  M54.50 (ICD-10-CM) - Acute bilateral low back pain without sciatica    2. Who is completing questionnaire? Patient  x Caregiver Family      3. Has the patient had any previous spinal/brain surgeries? NO        A. If yes, what is the name of the facility in which the surgery was performed? B. Procedure/Surgery performed? C. Who was the surgeon? D. When was the surgery? MM/YY       E. Did the patient improve after the surgery? 4. Is this a second opinion? If yes, Dr. Justin Woodson would like to review patient first before making the appointment. 5. Have MRI Images been obtain within the last year? Yes  No X      XR X CT     If yes, where was the imaging performed? [facility] (SCHEDULED PATIENT MRI FOR 9-22-22. PATIENT WILL BE OUT OF TOWN, THIS IS WHY IT WAS SCHEDULED SO FAR OUT. If yes, what part of the body? Lumbar X Cervical  Thoracic  Brain     If yes, when was it obtained?      02-    Note: if the scan was performed at a facility other than [facility], the disc will need to be brought to the appointment or we need to reach out to obtain the disc. A. Was the patient instructed to provide the disc? Yes   No X      8. Has the patient had a NCV/EMG within the last year? Yes  No X     If yes, where was it performed and date? MM/YY  Location:      9. Has the patient been to Physical Therapy? Yes  No X     If yes, what location, how long attended, and last visit? Location:        Therapy Lasted:    Date of Last Visit:      10. Has the patient been to Pain Management? Yes  No X     If yes, what location and last visit     Location:   Last Visit:   Is it helping?

## 2022-08-31 DIAGNOSIS — M54.32 BILATERAL SCIATICA: ICD-10-CM

## 2022-08-31 DIAGNOSIS — M54.31 BILATERAL SCIATICA: ICD-10-CM

## 2022-08-31 DIAGNOSIS — M54.50 ACUTE BILATERAL LOW BACK PAIN WITHOUT SCIATICA: ICD-10-CM

## 2022-08-31 DIAGNOSIS — M51.37 DEGENERATIVE DISC DISEASE AT L5-S1 LEVEL: ICD-10-CM

## 2022-08-31 DIAGNOSIS — R26.81 GAIT INSTABILITY: ICD-10-CM

## 2022-09-05 NOTE — TELEPHONE ENCOUNTER
Michael Heaton called to request a refill on her medication. Last office visit : 8/9/2022   Next office visit : Visit date not found     Last UDS: No results found for: [facility], [address], LABBENZ, BUPRENUR, COCAIMETSCRU, GABAPENTIN, MDMA, METAMPU, OPIATESCREENURINE, OXTCOSU, PHENCYCLIDINESCREENURINE, PROPOXYPHENE, THCSCREENUR, TRICYUR    Last Vicki Cousins: none Forwarded to [place] for Vicki Cousins  Medication Contract: none   Last Fill: 8-9-22    Requested Prescriptions     Pending Prescriptions Disp Refills    meloxicam (MOBIC) 15 MG tablet 90 tablet 1     Sig: Take 1 tablet by mouth daily as needed for Pain    tiZANidine (ZANAFLEX) 4 MG tablet 90 tablet 1     Sig: Take 1 tablet by mouth 3 times daily as needed (back pain)    gabapentin (NEURONTIN) 100 MG capsule 90 capsule 1     Sig: Take 1 capsule by mouth 3 times daily for 30 days. Please approve or refuse this medication.    Yordan Nunez LPN

## 2022-09-06 RX ORDER — MELOXICAM 15 MG/1
15 TABLET ORAL DAILY PRN
Qty: 90 TABLET | Refills: 1 | Status: SHIPPED | OUTPATIENT
Start: 2022-09-06

## 2022-09-06 RX ORDER — TIZANIDINE 4 MG/1
4 TABLET ORAL 3 TIMES DAILY PRN
Qty: 90 TABLET | Refills: 1 | Status: SHIPPED | OUTPATIENT
Start: 2022-09-06 | End: 2022-10-06 | Stop reason: SDUPTHER

## 2022-09-06 RX ORDER — GABAPENTIN 100 MG/1
100 CAPSULE ORAL 3 TIMES DAILY
Qty: 90 CAPSULE | Refills: 1 | Status: SHIPPED | OUTPATIENT
Start: 2022-09-06 | End: 2022-10-06 | Stop reason: SDUPTHER

## 2022-09-06 NOTE — TELEPHONE ENCOUNTER
PB was reviewed today per office protocol. Report shows No discrepancies. Fill pattern is consistent from single provider(s) at single pharmacy(s).

## 2022-09-22 ENCOUNTER — HOSPITAL ENCOUNTER (OUTPATIENT)
Dept: MRI IMAGING | Age: 37
Discharge: HOME | End: 2022-09-22
Payer: COMMERCIAL

## 2022-09-22 DIAGNOSIS — M54.31 BILATERAL SCIATICA: ICD-10-CM

## 2022-09-22 DIAGNOSIS — M51.37 DEGENERATIVE DISC DISEASE AT L5-S1 LEVEL: ICD-10-CM

## 2022-09-22 DIAGNOSIS — M54.32 BILATERAL SCIATICA: ICD-10-CM

## 2022-09-22 DIAGNOSIS — R26.81 GAIT INSTABILITY: ICD-10-CM

## 2022-09-22 PROCEDURE — 72158 MRI LUMBAR SPINE W/O & W/DYE: CPT

## 2022-09-22 PROCEDURE — 6360000004 HC RX CONTRAST MEDICATION: Performed by: NURSE PRACTITIONER

## 2022-09-22 PROCEDURE — A9577 INJ MULTIHANCE: HCPCS | Performed by: NURSE PRACTITIONER

## 2022-09-22 PROCEDURE — 72158 MRI LUMBAR SPINE W/O & W/DYE: CPT | Performed by: RADIOLOGY

## 2022-09-22 RX ADMIN — GADOBENATE DIMEGLUMINE 20 ML: 529 INJECTION, SOLUTION INTRAVENOUS at 12:36

## 2022-09-26 ENCOUNTER — OFFICE VISIT (OUTPATIENT)
Dept: NEUROSURGERY | Age: 37
End: 2022-09-26
Payer: COMMERCIAL

## 2022-09-26 VITALS
OXYGEN SATURATION: 99 % | HEART RATE: 77 BPM | RESPIRATION RATE: 18 BRPM | SYSTOLIC BLOOD PRESSURE: 124 MMHG | HEIGHT: 70 IN | DIASTOLIC BLOOD PRESSURE: 84 MMHG | BODY MASS INDEX: 34.07 KG/M2 | WEIGHT: 238 LBS

## 2022-09-26 DIAGNOSIS — M51.27 HERNIATION OF INTERVERTEBRAL DISC BETWEEN L5 AND S1: Primary | ICD-10-CM

## 2022-09-26 DIAGNOSIS — M54.41 CHRONIC BILATERAL LOW BACK PAIN WITH RIGHT-SIDED SCIATICA: ICD-10-CM

## 2022-09-26 DIAGNOSIS — R20.0 NUMBNESS OF TOES: ICD-10-CM

## 2022-09-26 DIAGNOSIS — G89.29 CHRONIC BILATERAL LOW BACK PAIN WITH RIGHT-SIDED SCIATICA: ICD-10-CM

## 2022-09-26 DIAGNOSIS — M51.36 DEGENERATIVE DISC DISEASE, LUMBAR: ICD-10-CM

## 2022-09-26 PROCEDURE — 99204 OFFICE O/P NEW MOD 45 MIN: CPT | Performed by: NURSE PRACTITIONER

## 2022-09-26 ASSESSMENT — ENCOUNTER SYMPTOMS
RESPIRATORY NEGATIVE: 1
GASTROINTESTINAL NEGATIVE: 1
EYES NEGATIVE: 1
BACK PAIN: 1

## 2022-09-26 NOTE — PROGRESS NOTES
Review of Systems   Constitutional: Negative. HENT: Negative. Eyes: Negative. Respiratory: Negative. Gastrointestinal: Negative. Genitourinary: Negative. Musculoskeletal:  Positive for back pain, joint pain and myalgias. Skin: Negative. Neurological:  Positive for tingling. Endo/Heme/Allergies: Negative. Psychiatric/Behavioral: Negative.

## 2022-09-26 NOTE — PROGRESS NOTES
[facility]  Office Visit      Chief Complaint   Patient presents with    New Patient     Establishing care     Results     MRI Lumbar Spine (9/22/2022)    Back Pain     Patient states around June is when her pain gradually started to come on. She states she is taking Gabapentin, Zanaflex, and ibuprofen PRN. Numbness     Patient states she does have numbness/tingling in her right leg and goes down to her foot. HISTORY OF PRESENT ILLNESS:    Lali Godinez is a 40 y.o. female with severe DDD at L5-S1 who presents with back pain that began in June and has gradually worsened. She has had one previous episode of back pain like this that occurred in February 2022 and dissipated after a round of steroids, denies any specific contributing event either occurrence. The pain does radiate into the right buttock, hip and extending down posterior thigh and calf with numbness in her 5th digit of her foot. She states this pain was also present on the left when the pain first began. Her pain is mostly located right buttock, hip, and leg. She does not report noticeable weakness, but does not that she has been falling more frequently lately. Her pain is not changed when going from a seated to standing position. Her pain is improved with walking. Her pain is worse with prolonged standing or walking. Her pain is improved when lying flat. Overall, indicative that the patient does have a mechanical nature to their pain. She states that 20% of her pain is located in the back and 80% is leg pain.     The patient has underwent a non-operative treatment course that has included:  NSAIDs (Diclofenac with no relief, meloxicam with no improvement- takes ibuprofen when she is going to be working, works as a PACU nurse which requires standing)  Muscle Relaxers (Flexeril with no relief, tizanidine moderate relief- helps her sleep)  Oral Steroids (maximum improvement in February when she had back pain; however, no relief Positive for tingling. Endo/Heme/Allergies: Negative. Psychiatric/Behavioral: Negative. PHYSICAL EXAM:  Vitals:    09/26/22 1307   BP: 124/84   Pulse: 77   Resp: 18   SpO2: 99%     Constitutional: appears well-developed and well-nourished. Eyes - conjunctiva normal.  Pupils react to light  Ear, nose, throat - hearing intact to finger rub, No scars, masses, or lesions over external nose or ears, no atrophy oftongue  Neck- symmetric, no masses noted, no jugular vein distension  Respiration- chest wall appears symmetric, good expansion, normal effort without use of accessory muscles  Musculoskeletal - no significant wasting of muscles noted, no bony deformities, gait no gross ataxia  Extremities- no clubbing, cyanosis oredema  Skin - warm, dry, and intact. No rash, erythema, or pallor. Psychiatric - mood, affect, and behavior appear normal.     Neurologic Examination  Awake, Alert and oriented x 4  Normal speech pattern, following commands    Motor:  RIGHT:     iliopsoas 5/5    knee flexor 5/5    knee extension 5/5    EHL 5/5   dorsiflexion 5/5    plantar flexion 5/5    LEFT:       iliopsoas 5/5    knee flexor 5/5    knee extension 5/5    EHL 5/5   dorsiflexion 5/5    plantar flexion 5/5    Deficit to pinprick sensation on right lateral lower leg  Reflexes are 2+, absent Achilles on right  Normal Gait pattern  Can stand on toes      DATA and IMAGING:    Nursing/pcp notes, imaging, labs, and vitals reviewed.      PT,OT and/or speech notes reviewed    Lab Results   Component Value Date    WBC 9.6 03/04/2019    HGB 11.1 (L) 03/04/2019    HCT 35.2 (L) 03/04/2019    MCV 87.6 03/04/2019     03/04/2019     Lab Results   Component Value Date     03/04/2019    K 4.4 03/04/2019     03/04/2019    CO2 25 03/04/2019    BUN 8 03/04/2019    CREATININE 0.6 03/04/2019    GLUCOSE 109 04/12/2022    CALCIUM 9.2 03/04/2019    PROT 7.6 03/04/2019    LABALBU 4.1 03/04/2019    BILITOT <0.2 03/04/2019    ALKPHOS 121 (H) 03/04/2019    AST 14 03/04/2019    ALT 10 03/04/2019    LABGLOM >60 03/04/2019   No results found for: INR, PROTIME  Narrative   NO PRIOR REPORT AVAILABLE   Exam: MRI OF THE LUMBAR SPINE WITHOUT AND WITH CONTRAST   Clinical data: Degenerative disc disease at L5-S1 level. Bilateral sciatica. Gait instability. Technique: Multiplanar multisequence MRI of the lumbar spine without and with contrast. Axial imaging was performed through the L1 through S1 disc levels. Contrast used:MultiHance. Amount: 20 mL. Prior studies: No prior studies submitted. Findings: For the purposes of this examination it was assumed that there are five non rib bearing lumbar type vertebrae with the inferior labeled L5. Normal lordotic curvature. No acute fracture or subluxation. Normal vertebral body and intervertebral    disc heights. Normal marrow signal of the vertebrae. Conus medullaris in normal anatomic position. No abnormal extra-axial masses. Soft tissues are unremarkable. T1 and T2 hyperintense hemangiomas are within L2 and L5. There are Modic type 2    degenerative endplate changes at H2-R1. Multilevel facet arthropathy. Level by leveldisease is present as follows:   T12-L1: There is no abnormally positioned disc material. There is no significant spinal canal, lateral recess, neural foramina compromise, or nerve impingement. L1-L2: There is no abnormally positioned disc material. There is no significant spinal canal, lateral recess, neural foramina compromise, or nerve impingement. L2-L3: There is no abnormally positioned disc material. There is no significant spinal canal, lateral recess, neural foramina compromise, or nerve impingement. L3-L4: There is no abnormally positioned disc material. There is no significant spinal canal, lateral recess, neural foramina compromise, or nerve impingement.    L4-L5: There is no abnormally positioned disc material. There is no significant spinal canal, lateral recess, neural foramina compromise, or nerve impingement. L5-S1:  Disc height narrowing and multilevel disc desiccation. A nonenhancing extruded disc is at L5-S1 measuring 1.5 x 1.0 cm. The disc compresses the thecal sac. The extruded disc causes spinal canal stenosis with an AP diameter measuring 7 mm. Post contrast imaging demonstrates no abnormal cord, leptomeningeal or soft tissue enhancement. Impression   1. L5-S1:  Disc height narrowing and multilevel disc desiccation. A nonenhancing extruded disc is at L5-S1 measuring 1.5 x 1.0 cm. The disc compresses the thecal sac. The extruded disc causes spinal canal stenosis with an AP diameter measuring 7 mm. Recommendation:    Follow up as clinically indicated. Electronically Signed by Lisa Arias MD at 23-Sep-2022 09:36:18 AM            Narrative   EXAMINATION:  XR LUMBAR SPINE (2-3 VIEWS)  2/28/2022 3:40 PM   HISTORY: Lumbar pain, chronic. COMPARISON: No comparison study. TECHNIQUE: 3 views were obtained. FINDINGS: There is mild thoracolumbar scoliosis to the left versus   positioning artifact. The Kenny angle is 9 degrees. There is mild   narrowing of the L3-4 disc space. There is moderate to severe   narrowing of the L5-S1 disc space. There is facet arthropathy at   L3-S1. The pedicles are intact. No acute fracture is seen. Impression   1. Mild thoracolumbar scoliosis to the left versus positioning   artifact. 2. Disc narrowing and facet arthropathy, as described. Signed by Dr Agustina Barber         I have personally reviewed these images and my interpretation is:  DDD at L5-S1. L5-S1: There is a large central disc extrusion that results in moderate to severe canal stenosis and comes into contact with bilateral S1 nerve roots. No scoliosis or apparent compression fractures. ASSESSMENT:    Baltazar Hennessy is a 40 y.o. female with severe DDD at L5-S1 who presents with back pain that began in June and has gradually worsened. ICD-10-CM    1. Herniation of intervertebral disc between L5 and S1  M51.27 [facility]      2. Chronic bilateral low back pain with right-sided sciatica  M54.41 [facility]    G89.29       3. Numbness of toes  R20.0 [facility]      4. Degenerative disc disease, lumbar  M51.36 [facility]          PLAN:  I have discussed and reviewed the results of the MRI and XR of the lumbar spine with Ms. Ellen Coyle at length. I explained that she has a large herniated disc at L5-S1 that is coming into contact with bilateral S1 nerve roots, likely causing her symptoms. Given that her pain is somewhat tolerable and she is neurologically intact, I will have her attempt PT to see if she gets any relief.     -TENS unit  -Will order PT  -FU in 6 weeks        Andre January, APRN

## 2022-10-06 DIAGNOSIS — M54.50 ACUTE BILATERAL LOW BACK PAIN WITHOUT SCIATICA: ICD-10-CM

## 2022-10-06 DIAGNOSIS — M54.32 BILATERAL SCIATICA: ICD-10-CM

## 2022-10-06 DIAGNOSIS — M54.31 BILATERAL SCIATICA: ICD-10-CM

## 2022-10-06 DIAGNOSIS — R26.81 GAIT INSTABILITY: ICD-10-CM

## 2022-10-06 DIAGNOSIS — M51.37 DEGENERATIVE DISC DISEASE AT L5-S1 LEVEL: ICD-10-CM

## 2022-10-07 RX ORDER — GABAPENTIN 100 MG/1
100 CAPSULE ORAL 3 TIMES DAILY
Qty: 90 CAPSULE | Refills: 0 | Status: SHIPPED | OUTPATIENT
Start: 2022-10-07 | End: 2022-11-06

## 2022-10-07 RX ORDER — TIZANIDINE 4 MG/1
4 TABLET ORAL 3 TIMES DAILY PRN
Qty: 90 TABLET | Refills: 0 | Status: SHIPPED | OUTPATIENT
Start: 2022-10-07

## 2022-10-07 NOTE — TELEPHONE ENCOUNTER
Meena Winchester called to request a refill on her medication. Last office visit : 8/9/2022   Next office visit : Visit date not found     Last UDS: not on file    No results found for: Arpit Shieldseller, LABBENZ, BUPRENUR, COCAIMETSCRU, GABAPENTIN, MDMA, METAMPU, OPIATESCREENURINE, OXTCOSU, PHENCYCLIDINESCREENURINE, PROPOXYPHENE, THCSCREENUR, TRICYUR    Last Tarry Martyr: 10/07/22  Medication Contract: need   Last Fill: 09/06/22    Requested Prescriptions     Pending Prescriptions Disp Refills    tiZANidine (ZANAFLEX) 4 MG tablet 90 tablet 1     Sig: Take 1 tablet by mouth 3 times daily as needed (back pain)    gabapentin (NEURONTIN) 100 MG capsule 90 capsule 1     Sig: Take 1 capsule by mouth 3 times daily for 30 days. Please approve or refuse this medication.    Luciano Emerson MA

## 2022-10-13 ENCOUNTER — HOSPITAL ENCOUNTER (OUTPATIENT)
Dept: PHYSICAL THERAPY | Age: 37
Setting detail: THERAPIES SERIES
Discharge: HOME | End: 2022-10-13
Payer: COMMERCIAL

## 2022-10-13 PROCEDURE — 97162 PT EVAL MOD COMPLEX 30 MIN: CPT

## 2022-10-13 ASSESSMENT — PAIN DESCRIPTION - FREQUENCY: FREQUENCY: CONTINUOUS

## 2022-10-13 ASSESSMENT — PAIN DESCRIPTION - LOCATION: LOCATION: BUTTOCKS;BACK

## 2022-10-13 ASSESSMENT — PAIN SCALES - GENERAL: PAINLEVEL_OUTOF10: 1

## 2022-10-13 ASSESSMENT — PAIN DESCRIPTION - ORIENTATION: ORIENTATION: MID

## 2022-10-13 ASSESSMENT — PAIN DESCRIPTION - DESCRIPTORS: DESCRIPTORS: DULL;ACHING

## 2022-10-13 NOTE — PROGRESS NOTES
Physical Therapy  Initial Assessment  Date: 10/13/2022  Patient Name: Hong Ferrera  MRN: [id number]  : 1985    Referring Physician: RODERICK Ray APRN   PCP: RODERICK Glover     Medical Diagnosis: Lumbago with sciatica, right side [M54.41]  Other chronic pain [G89.29]  Anesthesia of skin [R20.0]  Other intervertebral disc degeneration, lumbar region [M51.36]  Other intervertebral disc displacement, lumbosacral region [M51.27] chronic bilateral low back pain with right sided sciatica, numbness of toes, lumbar DDD, L5-S1 disc herniation  No data recorded    Insurance: Payor: Jignesh De / Plan: [employer] [address] / Product Type: *No Product type* /   Insurance ID: [id number] - ([employer])      Restrictions:       Subjective:   General  Chart Reviewed: Yes  Patient Assessed for Rehabilitation Services: Yes  Additional Pertinent Hx: 40year old female referred to PT with diagnoses of chronic bilateral low back pain with right sided sciatica, numbness of toes, lumbar DDD, L5-S1 disc herniation. 22 MRI showed L5-S1 disc height narrowing and multilevel disc dessication, nonenhancing extruded disc is at L5-S1 measuring 1.5 x 1.0 cm. (disc compresses the thecal sac, extruded disc causes spinal canal stenosis with an AP diameter measuring 7 mm). History obtained from[de-identified] Patient, Chart Review  Diagnosis: chronic bilateral low back pain with right sided sciatica, numbness of toes, lumbar DDD, L5-S1 disc herniation  Referring Provider (secondary): RODERICK Lutz  Follows Commands: Within Functional Limits  PT Visit Information  [facility]  Total # of Visits Approved: 12 (12 visits anticipated for these diagnoses)  Total # of Visits to Date: 1  Progress Note Due Date: 22  Subjective  Subjective: Patient reports she developed back pain about February of this year, no specific mechanism of injury is recalled.  Her symptoms vary, had some cramping in right calf, some numbness in right leg but not present at today's evaluation. The right foot little toe stays numb. Extended sitting is not comfortable, extended sitting and standing is difficult. Currently not awakened at night, has been taking gabapentin, zanaflex, meloxicam. At present, her pain is more confined to lower back and buttocks. She reports that her right great toe is numb. Getting in and out of bed and up and down from chairs is no longer difficult, but has been when her pain is at its greatest.  Prior diagnostic testing[de-identified] MRI  Pain Screening  Patient Currently in Pain: Yes  Pain Assessment: 0-10  Pain Level: 1  Pain Location: Buttocks, Back  Pain Orientation: Mid  Pain Descriptors: Dull, Aching  Pain Frequency: Continuous  Aggravating factors: Walking, Standing, Carrying, Work duties       Vision/Hearing:  Vision  Vision: Within Functional Limits  Hearing  Hearing: Within functional limits    Orientation:  Orientation  Overall Orientation Status: Within Normal Limits  Patient affect[de-identified] Normal  Follows Commands: Within Functional Limits    Social History:  Social History  Lives With: Spouse    Functional Status:  Functional Status  Occupation: Full time employment  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 She does not appear to be in acute distress at the time of eval and not reporting radicular symptoms into her leg. She is able to come to standing without difficulty, stands with increased bilateral knee valgus, increased foot pronation, decreased lumbar lordosis, flattened thoracic curve, left pelvic obliquity (standing on 1/4\" board decreases pelvic obliquity).  She is not appearing to have difficulty performing lumbar ROM for testing, but appears most limited with lumbar extension. No palpable areas of point tenderness or trigger points are noted. Spine  Lumbar: lumbar flexion 83 degrees, lumbar extension 22 degrees, left sidebending 20 degrees, right sidebending 30 degrees, bilateral lumbar rotation WNL    Strength RLE  Strength RLE: WNL  Strength LLE  Strength LLE: WNL     Additional Measures  Special Tests: negative SLR, negative for SI joint dysfunction, negative slump test. Patient appeared to respond a little more favorably to flexion motions than extension. No radicular symptoms were reproduced with any movements of trunk or changes in position. Other: Patient scored 16% impairment on the Oswestry Disability Questionnaire. Sensation  Overall Sensation Status: WFL (numbness in right little toe)     Transfers  Sit to Stand: Independent  Stand to Sit: Independent  Bed to Chair: Independent       Ambulation  Surface: Level tile  Device: No Device  Assistance: Independent  Quality of Gait: Decreased stance time right LE with drop to left side, otherwise WNL.      Current Level of Function  Grooming Independent/No Functional Limitation   Bathing Independent/No Functional Limitation   Upper Body Dressing Independent/No Functional Limitation   Lower Body Dressing Independent/No Functional Limitation   Don/doff Socks/Shoes Independent/No Functional Limitation   Toileting --   Self Care --   Bed Mobility Independent/No Functional Limitation   Transfers (sit to stand) Independent/No Functional Limitation   Transfers (stand to sit) Independent/No Functional Limitation   Walking Independent/No Functional Limitation          Assessment:    Conditions Requiring Skilled Therapeutic Intervention  Assessment: The following problems were identified at today's initial PT evaluation: 1) chronic low back pain, 2) intermittent radicular pain into buttocks and right LE, 3) decreased lumbar ROM, 4) postural deviation, with elevated right hip and left pelvic obliquity, decreased lumbar lordosis, 4) decreased tolerance for extended sitting and standing, 5) decreased ability to lift or carry (work and at home), 6) need for instruction in HEP. Therapy Prognosis: Good  Referring Provider (secondary): RODERICK Bonilla  Activity Tolerance  Activity Tolerance: Patient tolerated evaluation without incident  Activity Tolerance: Patient tolerated evaluation without incident     Plan:    Physcial Therapy Plan  Plan weeks: 4-6 weeks  Current Treatment Recommendations: Strengthening, ROM, Pain management, Home exercise program, Patient/Caregiver education & training, Manual Therapy - Soft Tissue Mobilization, Manual Therapy - Joint Manipulation, Modalities    Goals:  Short Term Goals  Short Term Goal 1: 2-3 weeks  STG 1 Current Status[de-identified] Patient to be independent with HEP. Short Term Goal 2: Patient to be independent with trial use of lift in left shoe to balance pelvic height. Short Term Goal 3: Patient to report less difficulty with carrying laundry basket and groceries. Long Term Goals  Time Frame for Long Term Goals : 4-6 weeks  Long Term Goal 1: Patient to have >= 25 degrees left sidebending of trunk. Long Term Goal 2: Patient to report decreased numbness in right little toe. Long Term Goal 3: Patient to score <= 5% impairment on the Oswestry Disability Questionnaire.      Therapy Time:   Individual Concurrent Group Co-treatment   Time In 1817         Time Out 1445         Minutes 46         Timed Code Treatment Minutes: Ozzy 39, PT

## 2022-10-19 ENCOUNTER — HOSPITAL ENCOUNTER (OUTPATIENT)
Dept: PHYSICAL THERAPY | Age: 37
Setting detail: THERAPIES SERIES
Discharge: HOME | End: 2022-10-19
Payer: COMMERCIAL

## 2022-10-19 PROCEDURE — 97012 MECHANICAL TRACTION THERAPY: CPT

## 2022-10-19 PROCEDURE — 97110 THERAPEUTIC EXERCISES: CPT

## 2022-10-19 ASSESSMENT — PAIN DESCRIPTION - ORIENTATION: ORIENTATION: RIGHT;LEFT;LOWER

## 2022-10-19 ASSESSMENT — PAIN DESCRIPTION - LOCATION: LOCATION: BACK;BUTTOCKS

## 2022-10-19 ASSESSMENT — PAIN SCALES - GENERAL: PAINLEVEL_OUTOF10: 3

## 2022-10-19 NOTE — PROGRESS NOTES
Physical Therapy  Daily Treatment Note  Date: 10/19/2022  Patient Name: Gwyn Robles  MRN: [id number]     :   1985    Referring Physician: RODERICK Keita APRN   PCP: RODERICK [place] Cha    Medical Diagnosis: Lumbago with sciatica, right side [M54.41]  Other chronic pain [G89.29]  Anesthesia of skin [R20.0]  Other intervertebral disc degeneration, lumbar region [M51.36]  Other intervertebral disc displacement, lumbosacral region [M51.27] chronic bilateral low back pain with right sided sciatica, numbness of toes, lumbar DDD, L5-S1 disc herniation  No data recorded    Insurance: Payor: Bebeto Escalante / Plan: [facility] [address] / Product Type: *No Product type* /   Insurance ID: [id number] - ([facility])    Subjective:   General  Diagnosis: chronic bilateral low back pain with right sided sciatica, numbness of toes, lumbar DDD, L5-S1 disc herniation  Referring Provider (secondary): RODERICK Mathew  PT Insurance Information: [facility] ([facility])  Total # of Visits Approved: 12  Total # of Visits to Date: 2  Progress Note Due Date: 22  Subjective: right now the pain isn't too bad, may be a 3  Patient Currently in Pain: Yes  Pain Level: 3  Pain Location: Back, Buttocks  Pain Orientation: Right, Left, Lower       Treatment Activities:  Exercises:      Treatment Reasoning    Exercise 1: ++monitor for signs of right leg radicular symptoms  Exercise 2: supine lower trunk stretch to left--4 reps, 15 sec hold  Exercise 3: supine left quadratus stretch to right--4 reps, 15 sec hold  Exercise 4: isometric resisted right hip extension from SKTC--8 reps, 5\" hold  Exercise 5: isometric resisted left hip flexion at 90/90--8 reps, 5\" hold  Exercise 6: pelvic tilts with folded towel under sacrum--15 reps  Exercise 7: abdominal series maintaining pelvic tilt (arms, legs, arms/legs)  x 10 ea  Exercise 8: contract/relax bilateral hamstring stretch--3 reps, 15 sec hold at end range  Exercise 9: axial traction to legs bilaterally--4 reps, 10\" hold  Exercise 10: double knee to chest stretch-- 5\" x 10 reps  Exercise 11: prone on elbows, up to 3 mins  Exercise 12: repeated sit to stand maintaining abdominal contraction  x 10  Exercise 13: standing back extension  x 10  Exercise 14: standing glute sets, feet shoulder width apart  3\" x 10  Exercise 15: Paloff press--20 reps bilaterally (RED)  Exercise 19: trial pelvic traction (start at 70# up to half body weight --100#)  70#  x 20'                           Assessment:   Conditions Requiring Skilled Therapeutic Intervention  Body Structures, Functions, Activity Limitations Requiring Skilled Therapeutic Intervention: Decreased functional mobility   Assessment: Initiated POC per initial evaluation, guided her through her exercise routine using verbal instruction and demonstration for proper performance of exercises and monitored for any radicular symptoms also. IPT initiated as  well at 70# pull with good tolerance. Requires PT Follow-Up: Yes        Goals:  Short Term Goals  Short Term Goal 1: 2-3 weeks  STG 1 Current Status[de-identified] Patient to be independent with HEP. Short Term Goal 2: Patient to be independent with trial use of lift in left shoe to balance pelvic height. Short Term Goal 3: Patient to report less difficulty with carrying laundry basket and groceries. Long Term Goals  Time Frame for Long Term Goals : 4-6 weeks  Long Term Goal 1: Patient to have >= 25 degrees left sidebending of trunk. Long Term Goal 2: Patient to report decreased numbness in right little toe. Long Term Goal 3: Patient to score <= 5% impairment on the Oswestry Disability Questionnaire.     Plan:    Physcial Therapy Plan  Plan weeks: 4-6 weeks  Current Treatment Recommendations: Strengthening, ROM, Pain management, Home exercise program, Patient/Caregiver education & training, Manual Therapy - Soft Tissue Mobilization, Manual Therapy - Joint Manipulation, Modalities     Therapy Time:   Individual Concurrent Group Co-treatment   Time In 2626         Time Out 1458         Minutes 63         Timed Code Treatment Minutes: 127 Kerwin Moya PTA   Electronically signed by Sarah Alan PTA on 10/19/2022 at 3:07 PM

## 2022-10-21 ENCOUNTER — HOSPITAL ENCOUNTER (OUTPATIENT)
Dept: PHYSICAL THERAPY | Age: 37
Setting detail: THERAPIES SERIES
Discharge: HOME | End: 2022-10-21
Payer: COMMERCIAL

## 2022-10-21 PROCEDURE — 97110 THERAPEUTIC EXERCISES: CPT

## 2022-10-21 PROCEDURE — 97012 MECHANICAL TRACTION THERAPY: CPT

## 2022-10-21 ASSESSMENT — PAIN SCALES - GENERAL: PAINLEVEL_OUTOF10: 3

## 2022-10-21 ASSESSMENT — PAIN DESCRIPTION - LOCATION: LOCATION: BACK

## 2022-10-21 ASSESSMENT — PAIN DESCRIPTION - DESCRIPTORS: DESCRIPTORS: SORE

## 2022-10-21 ASSESSMENT — PAIN DESCRIPTION - ORIENTATION: ORIENTATION: RIGHT;LEFT

## 2022-10-21 NOTE — PROGRESS NOTES
shoulder width apart  3\" x 10  Exercise 15: Paloff press--10 reps bilaterally (GREEN)  Exercise 16: 's bow  X 10  Exercise 17: standing sidebend stretch to left  X 10  Exercise 18: ball roll up wall  X 5 WITH 5 SECOND HOLD  Exercise 19: trial pelvic traction (start at 70# up to half body weight --100#)  80 POUNDS OF PULL  x 20 MINUTE  Exercise 20: 10/21  HEP issued    Assessment:   Conditions Requiring Skilled Therapeutic Intervention  Body Structures, Functions, Activity Limitations Requiring Skilled Therapeutic Intervention: Decreased functional mobility   Assessment: Patient showing good understanding of all exericses. HEP issued with green t-band. Traction performed at end of session. Therapy Prognosis: Good    Goals:Short Term Goals  Short Term Goal 1: 2-3 weeks  STG 1 Current Status[de-identified] Patient to be independent with HEP. Short Term Goal 2: Patient to be independent with trial use of lift in left shoe to balance pelvic height. Short Term Goal 3: Patient to report less difficulty with carrying laundry basket and groceries. Long Term Goals  Time Frame for Long Term Goals : 4-6 weeks  Long Term Goal 1: Patient to have >= 25 degrees left sidebending of trunk. Long Term Goal 2: Patient to report decreased numbness in right little toe. Long Term Goal 3: Patient to score <= 5% impairment on the Oswestry Disability Questionnaire.   Plan:    Physcial Therapy Plan  Plan weeks: 4-6 weeks  Current Treatment Recommendations: Strengthening, ROM, Pain management, Home exercise program, Patient/Caregiver education & training, Manual Therapy - Soft Tissue Mobilization, Manual Therapy - Joint Manipulation, Modalities  Timed Code Treatment Minutes: 50 Minutes   Therapy Time   Individual Concurrent Group Co-treatment   Time In 1300         Time Out 1410         Minutes 70         Timed Code Treatment Minutes: 50 Minutes   Electronically signed by Kip Salazar PTA on 10/21/2022 at 3:01 PM

## 2022-10-25 ENCOUNTER — HOSPITAL ENCOUNTER (OUTPATIENT)
Dept: PHYSICAL THERAPY | Age: 37
Setting detail: THERAPIES SERIES
Discharge: HOME | End: 2022-10-25
Payer: COMMERCIAL

## 2022-10-25 PROCEDURE — 97012 MECHANICAL TRACTION THERAPY: CPT

## 2022-10-25 PROCEDURE — 97110 THERAPEUTIC EXERCISES: CPT

## 2022-10-25 ASSESSMENT — PAIN SCALES - GENERAL: PAINLEVEL_OUTOF10: 2

## 2022-10-25 ASSESSMENT — PAIN DESCRIPTION - LOCATION: LOCATION: BACK

## 2022-10-25 NOTE — PROGRESS NOTES
Physical Therapy  Daily Treatment Note  Date: 10/25/2022  Patient Name: Susan Collins  MRN: [id number]     :   1985    Referring Physician: RODERICK Abraham APRN   PCP: RODERICK Jones    Medical Diagnosis: Lumbago with sciatica, right side [M54.41]  Other chronic pain [G89.29]  Anesthesia of skin [R20.0]  Other intervertebral disc degeneration, lumbar region [M51.36]  Other intervertebral disc displacement, lumbosacral region [M51.27] chronic bilateral low back pain with right sided sciatica, numbness of toes, lumbar DDD, L5-S1 disc herniation  No data recorded    Insurance: Payor: Abe Abel / Plan: [facility] [address] / Product Type: *No Product type* /   Insurance ID: [id number] - ([facility])    Subjective:   General  Diagnosis: chronic bilateral low back pain with right sided sciatica, numbness of toes, lumbar DDD, L5-S1 disc herniation  Referring Provider (secondary): RODERICK Nguyen  PT Insurance Information: [facility] [place] [place]  Total # of Visits Approved: 12  Total # of Visits to Date: 4  Progress Note Due Date: 22  Subjective: right now it's not bad, may be a 2  Patient Currently in Pain: Yes  Pain Level: 2  Pain Location: Back       Treatment Activities:  Exercises:      Treatment Reasoning    Exercise 1: ++monitor for signs of right leg radicular symptoms  Exercise 2: supine lower trunk stretch to left--4 reps, 15 sec hold  Exercise 3: supine left quadratus stretch to right--4 reps, 15 sec hold  Exercise 4: isometric resisted right hip extension from SKTC--8 reps, 5\" hold  Exercise 5: isometric resisted left hip flexion at 90/90--8 reps, 5\" hold  Exercise 6: pelvic tilts with folded towel under sacrum--15 reps  Exercise 7: abdominal series maintaining pelvic tilt (arms, legs, arms/legs)  x 10 ea  Exercise 8: contract/relax bilateral hamstring stretch--3 reps, 15 sec hold at end range  Exercise 9: axial traction to legs bilaterally--4 reps, 10\" hold  Exercise 10: double knee to chest stretch-- 5\" x 10 reps  Exercise 11: prone on elbows, up to 3 mins  Exercise 12: repeated sit to stand maintaining abdominal contraction  x 10  Exercise 13: standing back extension  x 10  Exercise 14: standing glute sets, feet shoulder width apart  3\" x 10  Exercise 15: Paloff press--10 reps bilaterally (GREEN)  Exercise 16: 's bow  X 10  Exercise 17: standing sidebend stretch to left  X 10  Exercise 18: ball roll up wall  X 5 WITH 5 SECOND HOLD  Exercise 19: trial pelvic traction (start at 70# up to half body weight --100#)  80 POUNDS OF PULL  x 20 MINUTE  Exercise 20: 10/21  HEP issued                           Assessment:   Conditions Requiring Skilled Therapeutic Intervention  Body Structures, Functions, Activity Limitations Requiring Skilled Therapeutic Intervention: Decreased functional mobility   Assessment: Patient had some increased pain central to R lower back performing waiters bow so instructed her not to bend over as far when stretching and her R h/s was tighter than L with manual stretching but overall appears to be tolerating therapy well. Requires PT Follow-Up: Yes        Goals:  Short Term Goals  Short Term Goal 1: 2-3 weeks  STG 1 Current Status[de-identified] Patient to be independent with HEP. Short Term Goal 2: Patient to be independent with trial use of lift in left shoe to balance pelvic height. Short Term Goal 3: Patient to report less difficulty with carrying laundry basket and groceries. Long Term Goals  Time Frame for Long Term Goals : 4-6 weeks  Long Term Goal 1: Patient to have >= 25 degrees left sidebending of trunk. Long Term Goal 2: Patient to report decreased numbness in right little toe. Long Term Goal 3: Patient to score <= 5% impairment on the Oswestry Disability Questionnaire.     Plan:    Physcial Therapy Plan  Plan weeks: 4-6 weeks  Current Treatment Recommendations: Strengthening, ROM, Pain management, Home exercise program, Patient/Caregiver education & training, Manual Therapy - Soft Tissue Mobilization, Manual Therapy - Joint Manipulation, Modalities     Therapy Time:   Individual Concurrent Group Co-treatment   Time In 6551         Time Out 1350         Minutes 52         Timed Code Treatment Minutes: 0667 [facility]   Electronically signed by Vic Bourgeois PTA on 10/25/2022 at 1:58 PM

## 2022-10-28 ENCOUNTER — HOSPITAL ENCOUNTER (OUTPATIENT)
Dept: PHYSICAL THERAPY | Age: 37
Setting detail: THERAPIES SERIES
Discharge: HOME | End: 2022-10-28
Payer: COMMERCIAL

## 2022-10-28 PROCEDURE — 97110 THERAPEUTIC EXERCISES: CPT

## 2022-10-28 PROCEDURE — 97012 MECHANICAL TRACTION THERAPY: CPT

## 2022-10-28 ASSESSMENT — PAIN SCALES - GENERAL: PAINLEVEL_OUTOF10: 4

## 2022-10-28 ASSESSMENT — PAIN DESCRIPTION - ORIENTATION: ORIENTATION: LOWER;RIGHT;LEFT

## 2022-10-28 ASSESSMENT — PAIN DESCRIPTION - LOCATION: LOCATION: BACK;LEG

## 2022-10-28 ASSESSMENT — PAIN DESCRIPTION - DESCRIPTORS: DESCRIPTORS: SORE;NUMBNESS

## 2022-10-31 NOTE — PROGRESS NOTES
Physical Therapy: Daily Note   Patient: Rin Vann (44 y.o. female)   Examination Date: 10/28/2022  No data recorded  No data recorded   :  1985 # of Visits since [facility]:   5   MRN: [id number]  CSN: [id number] Start of Care Date:   10/13/2022   Insurance: Payor: Royal Lovell / Plan: [facility] [address] / Product Type: *No Product type* /   Insurance ID: [id number] - ([facility]) Secondary Insurance (if applicable):    Referring Physician: RODERICK Whyte APRN   PCP: RODERICK Orta Visits to Date/Visits Approved:     No Show/Cancelled Appts:   /       Medical Diagnosis: Lumbago with sciatica, right side [M54.41]  Other chronic pain [G89.29]  Anesthesia of skin [R20.0]  Other intervertebral disc degeneration, lumbar region [M51.36]  Other intervertebral disc displacement, lumbosacral region [M51.27]    No data recorded      SUBJECTIVE EXAMINATION   Pain Level: Pain Screening  Patient Currently in Pain: Yes  Pain Assessment: 0-10  Pain Level: 4  Pain Location: Back, Leg  Pain Orientation: Lower, Right, Left  Pain Descriptors: Sore, Numbness    Patient Comments: Subjective: Thought she had an episode of radicular numbness in RLE yesterday but it seems to have resolved now.     OBJECTIVE EXAMINATION   Restrictions:  No data recorded No data recorded No data recorded      TREATMENT     Exercises:      Treatment Reasoning    Exercise 1: ++monitor for signs of right leg radicular symptoms  Exercise 2: supine lower trunk stretch to left--4 reps, 15 sec hold  Exercise 3: supine left quadratus stretch to right--4 reps, 15 sec hold  Exercise 4: isometric resisted right hip extension from SKTC--8 reps, 5\" hold- not today  Exercise 5: isometric resisted left hip flexion at 90/90--8 reps, 5\" hold- not today  Exercise 6: pelvic tilts with folded towel under sacrum--15 reps  Exercise 7: abdominal series maintaining pelvic tilt (arms, legs, arms/legs)  x 10 ea  Exercise 8: contract/relax bilateral hamstring stretch--3 reps, 15 sec hold at end range  Exercise 9: axial traction to legs bilaterally--4 reps, 10\" hold  Exercise 10: double knee to chest stretch-- 5\" x 10 reps  Exercise 11: prone on elbows, up to 3 mins  Exercise 12: repeated sit to stand maintaining abdominal contraction  x 10  Exercise 13: standing back extension  x 10  Exercise 14: standing glute sets, feet shoulder width apart  3\" x 10  Exercise 15: Paloff press--10 reps bilaterally (GREEN)  Exercise 16: 's bow  X 10  Exercise 17: standing sidebend stretch to left  X 10  Exercise 18: ball roll up wall  X 5 WITH 5 SECOND HOLD- not today  Exercise 19: trial pelvic traction (start at 70# up to half body weight --100#)  85 POUNDS OF PULL  x 20 MINUTE  Exercise 20: 10/21  HEP issued                        ASSESSMENT     Assessment: Assessment: Pt reports no complaints with PT sessions. She is not really sure if she is having any changes in radicular symptoms at this point, but focused on improving core strength and engagement. Reminded to keep exercises within a pain-free ROM, as she tends to push beyond that. No complaints at end of today's session. Body Structures, Functions, Activity Limitations Requiring Skilled Therapeutic Intervention: Decreased functional mobility     Post-Treatment Pain Level: Activity Tolerance: Patient tolerated evaluation without incident    Therapy Prognosis: Good       GOALS      Short Term Goals Completed by   Current Status Goal Status   2-3 weeks Patient to be independent with HEP. Patient to be independent with trial use of lift in left shoe to balance pelvic height. Patient to report less difficulty with carrying laundry basket and groceries. Long Term Goals Completed by 4-6 weeks Current Status Goal Status   Patient to have >= 25 degrees left sidebending of trunk.        Patient to report decreased numbness in right little toe. Patient to score <= 5% impairment on the Oswestry Disability Questionnaire.                                                                     TREATMENT PLAN   Plan Frequency: 2 x per week  Plan weeks: 4-6 weeks  Current Treatment Recommendations: Strengthening, ROM, Pain management, Home exercise program, Patient/Caregiver education & training, Manual Therapy - Soft Tissue Mobilization, Manual Therapy - Joint Manipulation, Modalities   Requires PT Follow-Up: Yes       Therapy Time  Individual Time In:  5120  Individual Time Out:  7769  Minutes:  54        Electronically signed by Faiza Beasley PT  on 10/31/2022 at 11:10 AM

## 2022-11-01 ENCOUNTER — HOSPITAL ENCOUNTER (OUTPATIENT)
Dept: PHYSICAL THERAPY | Age: 37
Setting detail: THERAPIES SERIES
Discharge: HOME | End: 2022-11-01
Payer: COMMERCIAL

## 2022-11-01 PROCEDURE — 97012 MECHANICAL TRACTION THERAPY: CPT

## 2022-11-01 PROCEDURE — 97110 THERAPEUTIC EXERCISES: CPT

## 2022-11-01 ASSESSMENT — PAIN DESCRIPTION - DESCRIPTORS: DESCRIPTORS: SORE;NUMBNESS

## 2022-11-01 ASSESSMENT — PAIN SCALES - GENERAL: PAINLEVEL_OUTOF10: 3

## 2022-11-01 ASSESSMENT — PAIN DESCRIPTION - ORIENTATION: ORIENTATION: LEFT;LOWER

## 2022-11-01 ASSESSMENT — PAIN DESCRIPTION - LOCATION: LOCATION: BACK;LEG

## 2022-11-01 ASSESSMENT — PAIN DESCRIPTION - PAIN TYPE: TYPE: CHRONIC PAIN

## 2022-11-01 NOTE — PROGRESS NOTES
Physical Therapy  Daily Treatment Note  Date: 2022  Patient Name: Renny Green  MRN: [id number]     :   1985    Subjective:      PT Visit Information  PT Insurance Information: [facility]  Total # of Visits Approved: 12  Total # of Visits to Date: 6  Progress Note Due Date: 22  Subjective: Patient states she is feeling about the same today. Nothing new to report. Pain Screening  Patient Currently in Pain: Yes  Pain Assessment: 0-10  Pain Level: 3  Pain Type: Chronic pain  Pain Location: Back;Leg  Pain Orientation: Left; Lower  Pain Descriptors: Sore;Numbness         Treatment Activities:   Exercises  Exercise 1: ++monitor for signs of right leg radicular symptoms  Exercise 2: supine lower trunk stretch to left--4 reps, 15 sec hold  Exercise 3: supine left quadratus stretch to right--4 reps, 15 sec hold  Exercise 4: isometric resisted right hip extension from SKTC--8 reps, 5\" hold- not today  Exercise 5: isometric resisted left hip flexion at 90/90--8 reps, 5\" hold- not today  Exercise 6: pelvic tilts with folded towel under sacrum--15 reps  Exercise 7: abdominal series maintaining pelvic tilt (arms, legs, arms/legs)  x 10 ea  Exercise 8: contract/relax bilateral hamstring stretch--3 reps, 15 sec hold at end range  Exercise 9: axial traction to legs bilaterally--4 reps, 10\" hold  Exercise 10: double knee to chest stretch-- 5\" x 10 reps  Exercise 11: prone on elbows, up to 3 mins  Exercise 12: repeated sit to stand maintaining abdominal contraction  x 10  Exercise 13: standing back extension  x 10  Exercise 14: standing glute sets, feet shoulder width apart  3\" x 15  Exercise 15: Paloff press--15 reps bilaterally (GREEN)  Exercise 16: 's bow  X 10  Exercise 17: standing sidebend stretch to left  X 10  Exercise 18: ball roll up wall  X 5 WITH 5 SECOND HOLD  Exercise 19: trial pelvic traction (start at 70# up to half body weight --100#)  90 POUNDS OF PULL  x 20 MINUTE  Exercise 20: 10/21  HEP issued     Assessment:   Conditions Requiring Skilled Therapeutic Intervention  Body Structures, Functions, Activity Limitations Requiring Skilled Therapeutic Intervention: Decreased functional mobility   Assessment: Patient presents with continued low back and L LE symptoms this date that remains about the same as her previous session. She tolerates increased reps and increased traction pull having no c/o increased symptoms during session. Will assess her response to today's session and adjust as needed at her next visits. Goals:Short Term Goals  Short Term Goal 1: 2-3 weeks  STG 1 Current Status[de-identified] Patient to be independent with HEP. Short Term Goal 2: Patient to be independent with trial use of lift in left shoe to balance pelvic height. Short Term Goal 3: Patient to report less difficulty with carrying laundry basket and groceries. Long Term Goals  Time Frame for Long Term Goals : 4-6 weeks  Long Term Goal 1: Patient to have >= 25 degrees left sidebending of trunk. Long Term Goal 2: Patient to report decreased numbness in right little toe. Long Term Goal 3: Patient to score <= 5% impairment on the Oswestry Disability Questionnaire.     Plan:    Physcial Therapy Plan  Plan weeks: 4-6 weeks  Current Treatment Recommendations: Strengthening, ROM, Pain management, Home exercise program, Patient/Caregiver education & training, Manual Therapy - Soft Tissue Mobilization, Manual Therapy - Joint Manipulation, Modalities  Timed Code Treatment Minutes: 43 Minutes     Therapy Time   Individual Concurrent Group Co-treatment   Time In 1300         Time Out 1403         Minutes 63         Timed Code Treatment Minutes: 43 Minutes     Electronically signed by Vinicius Vences PTA on 11/1/2022 at 2:39 PM

## 2022-11-02 ENCOUNTER — APPOINTMENT (OUTPATIENT)
Dept: PHYSICAL THERAPY | Age: 37
End: 2022-11-02
Payer: COMMERCIAL

## 2022-11-04 DIAGNOSIS — R26.81 GAIT INSTABILITY: ICD-10-CM

## 2022-11-04 DIAGNOSIS — M54.50 ACUTE BILATERAL LOW BACK PAIN WITHOUT SCIATICA: ICD-10-CM

## 2022-11-04 DIAGNOSIS — M54.31 BILATERAL SCIATICA: ICD-10-CM

## 2022-11-04 DIAGNOSIS — M54.32 BILATERAL SCIATICA: ICD-10-CM

## 2022-11-04 DIAGNOSIS — M51.37 DEGENERATIVE DISC DISEASE AT L5-S1 LEVEL: ICD-10-CM

## 2022-11-06 NOTE — TELEPHONE ENCOUNTER
Pauline Gloria called to request a refill on her medication. Last office visit : 8/9/2022   Next office visit : 11/8/2022     Last UDS: No results found for: Brandon Conception, LABBENZ, BUPRENUR, COCAIMETSCRU, GABAPENTIN, MDMA, METAMPU, OPIATESCREENURINE, OXTCOSU, PHENCYCLIDINESCREENURINE, PROPOXYPHENE, THCSCREENUR, TRICYUR    Last Giovanny: 10-7-22  Medication Contract: none   Last Fill: 10-7-22    Requested Prescriptions     Pending Prescriptions Disp Refills    gabapentin (NEURONTIN) 100 MG capsule 90 capsule 0     Sig: Take 1 capsule by mouth 3 times daily for 30 days. Please approve or refuse this medication.    Elian Sloan LPN

## 2022-11-07 RX ORDER — GABAPENTIN 100 MG/1
100 CAPSULE ORAL 3 TIMES DAILY
Qty: 90 CAPSULE | Refills: 0 | Status: SHIPPED | OUTPATIENT
Start: 2022-11-07 | End: 2022-12-07 | Stop reason: SDUPTHER

## 2022-11-08 ENCOUNTER — HOSPITAL ENCOUNTER (OUTPATIENT)
Dept: PHYSICAL THERAPY | Age: 37
Setting detail: THERAPIES SERIES
Discharge: HOME | End: 2022-11-08
Payer: COMMERCIAL

## 2022-11-08 ENCOUNTER — OFFICE VISIT (OUTPATIENT)
Dept: NEUROSURGERY | Age: 37
End: 2022-11-08
Payer: COMMERCIAL

## 2022-11-08 ENCOUNTER — OFFICE VISIT (OUTPATIENT)
Dept: PRIMARY CARE CLINIC | Age: 37
End: 2022-11-08
Payer: COMMERCIAL

## 2022-11-08 VITALS
TEMPERATURE: 98 F | SYSTOLIC BLOOD PRESSURE: 118 MMHG | WEIGHT: 233 LBS | BODY MASS INDEX: 33.36 KG/M2 | DIASTOLIC BLOOD PRESSURE: 74 MMHG | OXYGEN SATURATION: 99 % | HEART RATE: 71 BPM | HEIGHT: 70 IN

## 2022-11-08 VITALS
WEIGHT: 233 LBS | OXYGEN SATURATION: 97 % | SYSTOLIC BLOOD PRESSURE: 128 MMHG | DIASTOLIC BLOOD PRESSURE: 88 MMHG | HEART RATE: 82 BPM | BODY MASS INDEX: 33.36 KG/M2 | RESPIRATION RATE: 18 BRPM | HEIGHT: 70 IN

## 2022-11-08 DIAGNOSIS — R10.84 GENERALIZED ABDOMINAL PAIN: Primary | ICD-10-CM

## 2022-11-08 DIAGNOSIS — M54.41 CHRONIC BILATERAL LOW BACK PAIN WITH RIGHT-SIDED SCIATICA: ICD-10-CM

## 2022-11-08 DIAGNOSIS — R20.0 NUMBNESS OF TOES: ICD-10-CM

## 2022-11-08 DIAGNOSIS — Z79.899 DRUG THERAPY: ICD-10-CM

## 2022-11-08 DIAGNOSIS — G89.29 CHRONIC BILATERAL LOW BACK PAIN WITH RIGHT-SIDED SCIATICA: ICD-10-CM

## 2022-11-08 DIAGNOSIS — M51.27 HERNIATION OF INTERVERTEBRAL DISC BETWEEN L5 AND S1: Primary | ICD-10-CM

## 2022-11-08 PROCEDURE — 97110 THERAPEUTIC EXERCISES: CPT

## 2022-11-08 PROCEDURE — 99213 OFFICE O/P EST LOW 20 MIN: CPT | Performed by: NEUROLOGICAL SURGERY

## 2022-11-08 PROCEDURE — 80305 DRUG TEST PRSMV DIR OPT OBS: CPT | Performed by: NURSE PRACTITIONER

## 2022-11-08 PROCEDURE — 99214 OFFICE O/P EST MOD 30 MIN: CPT | Performed by: NURSE PRACTITIONER

## 2022-11-08 PROCEDURE — 97012 MECHANICAL TRACTION THERAPY: CPT

## 2022-11-08 RX ORDER — PANTOPRAZOLE SODIUM 20 MG/1
20 TABLET, DELAYED RELEASE ORAL DAILY
Qty: 30 TABLET | Refills: 3 | Status: SHIPPED | OUTPATIENT
Start: 2022-11-08

## 2022-11-08 ASSESSMENT — PAIN SCALES - GENERAL: PAINLEVEL_OUTOF10: 2

## 2022-11-08 ASSESSMENT — ENCOUNTER SYMPTOMS
PHOTOPHOBIA: 0
SHORTNESS OF BREATH: 0
COUGH: 0
RESPIRATORY NEGATIVE: 1
VOICE CHANGE: 0
BACK PAIN: 0
RHINORRHEA: 0
COLOR CHANGE: 0
GASTROINTESTINAL NEGATIVE: 1
VOMITING: 0
NAUSEA: 0
BACK PAIN: 1
EYES NEGATIVE: 1

## 2022-11-08 ASSESSMENT — PAIN DESCRIPTION - ORIENTATION: ORIENTATION: LEFT;LOWER

## 2022-11-08 ASSESSMENT — PAIN DESCRIPTION - PAIN TYPE: TYPE: CHRONIC PAIN

## 2022-11-08 ASSESSMENT — PAIN DESCRIPTION - DESCRIPTORS: DESCRIPTORS: SORE;NUMBNESS

## 2022-11-08 ASSESSMENT — PAIN DESCRIPTION - LOCATION: LOCATION: BACK;LEG

## 2022-11-08 NOTE — PROGRESS NOTES
[address] [facility]  [address] [address]  [address] [address]  Dept: [phone]  Dept Fax: [phone]  Loc: [phone]    Reina Govea is a 40 y.o. female who presents today for her medical conditions/complaints as noted below. Reina Govea is c/o of Follow-up (Was having stomach pains)        HPI:     HPI   Chief Complaint   Patient presents with    Follow-up     Was having stomach pains     Patient presents today for evaluation of intermittent stomach pain. She states she has episodes of feeling bloated in entire abdomen and up into chest. She states there is often \"burning\". She states she notices with greasy food symptoms are worse. She does not take anything other than tums when this occurs. She has occasional nausea and vomiting. She reports this has been intermittent for 5 years but this past year it is happening much more often. No gallbladder studies have been done; she has never had colonoscopy/EGD. She has frequent belching with episodes as well. Periods are normal; no dysuria.        Past Medical History:   Diagnosis Date    Abnormal Pap smear of cervix           Past Surgical History:   Procedure Laterality Date     SECTION         Vitals 2022 2022 10/28/2022 10/25/2022 10/21/2022    SYSTOLIC 714 - - - - -   DIASTOLIC 74 - - - - -   Site - - - - - -   Position - - - - - -   Pulse 71 - - - - -   Temp 98 - - - - -   Resp - - - - - -   SpO2 99 - - - - -   Weight 233 lb - - - - -   Height 5' 10\" - - - - -   Body mass index 33.43 kg/m2 - - - - -   Pain Level - 3 4 2 3 3   Waist (Inches) - - - - - -   Some recent data might be hidden       Family History   Problem Relation Age of Onset    Stroke Maternal Grandfather     Stroke Maternal Grandmother     Stroke Paternal Uncle     Stroke Paternal Aunt     Cancer Neg Hx     Diabetes Neg Hx     Heart Attack Neg Hx        Social History     Tobacco Use    Smoking status: Never    Smokeless tobacco: Never   Substance Use Topics    Alcohol use: Yes     Alcohol/week: 0.0 standard drinks     Comment: rare      Current Outpatient Medications on File Prior to Visit   Medication Sig Dispense Refill    gabapentin (NEURONTIN) 100 MG capsule Take 1 capsule by mouth 3 times daily for 30 days. 90 capsule 0    tiZANidine (ZANAFLEX) 4 MG tablet Take 1 tablet by mouth 3 times daily as needed (back pain) 90 tablet 0    meloxicam (MOBIC) 15 MG tablet Take 1 tablet by mouth daily as needed for Pain 90 tablet 1    diclofenac (VOLTAREN) 50 MG EC tablet Take 1 tablet by mouth in the morning and 1 tablet at noon and 1 tablet in the evening. Take with meals. 30 tablet 5    cyclobenzaprine (FLEXERIL) 5 MG tablet TAKE 1 TABLET BY MOUTH 3 TIMES A DAY AS NEEDED FOR MUSCLE SPASMS 30 tablet 5    cetirizine (ZYRTEC) 10 MG tablet Take 10 mg by mouth daily        No current facility-administered medications on file prior to visit. No Known Allergies    Health Maintenance   Topic Date Due    Varicella vaccine (1 of 2 - 2-dose childhood series) Never done    HIV screen  Never done    Hepatitis C screen  Never done    Diabetes screen  06/03/2021    COVID-19 Vaccine (4 - Booster for Pfizer series) 01/13/2022    Flu vaccine (1) 08/01/2022    Depression Screen  07/19/2023    Cervical cancer screen  05/13/2026    DTaP/Tdap/Td vaccine (2 - Td or Tdap) 05/15/2027    Hepatitis A vaccine  Aged Out    Hib vaccine  Aged Out    Meningococcal (ACWY) vaccine  Aged Out    Pneumococcal 0-64 years Vaccine  Aged Out       Subjective:      Review of Systems   Constitutional:  Negative for chills and fever. HENT:  Negative for ear pain, hearing loss, rhinorrhea and voice change. Eyes:  Negative for photophobia and visual disturbance. Respiratory:  Negative for cough and shortness of breath. Cardiovascular:  Negative for chest pain and palpitations. Gastrointestinal:  Negative for nausea and vomiting.    Endocrine: Negative. Negative for cold intolerance and heat intolerance. Genitourinary:  Negative for difficulty urinating and flank pain. Musculoskeletal:  Negative for back pain and neck pain. Skin:  Negative for color change and rash. Allergic/Immunologic: Negative for environmental allergies and food allergies. Neurological:  Negative for dizziness, speech difficulty and headaches. Hematological:  Does not bruise/bleed easily. Psychiatric/Behavioral:  Negative for sleep disturbance and suicidal ideas. Objective:     Physical Exam  Vitals and nursing note reviewed. Constitutional:       Appearance: She is well-developed. HENT:      Head: Atraumatic. Right Ear: External ear normal.      Left Ear: External ear normal.      Nose: Nose normal.   Eyes:      Conjunctiva/sclera: Conjunctivae normal.      Pupils: Pupils are equal, round, and reactive to light. Cardiovascular:      Rate and Rhythm: Normal rate and regular rhythm. Heart sounds: Normal heart sounds, S1 normal and S2 normal.   Pulmonary:      Effort: Pulmonary effort is normal.      Breath sounds: Normal breath sounds. Abdominal:      General: Bowel sounds are normal.      Palpations: Abdomen is soft. Musculoskeletal:         General: Normal range of motion. Cervical back: Normal range of motion and neck supple. Skin:     General: Skin is warm and dry. Neurological:      Mental Status: She is alert and oriented to person, place, and time. Psychiatric:         Behavior: Behavior normal.     /74   Pulse 71   Temp 98 °F (36.7 °C) (Temporal)   Ht 5' 10\" (1.778 m)   Wt 233 lb (105.7 kg)   SpO2 99%   BMI 33.43 kg/m²     Assessment:       Diagnosis Orders   1. Generalized abdominal pain  US GALLBLADDER RUQ    CBC with Auto Differential    Comprehensive Metabolic Panel    Lipid Panel    Hemoglobin A1C    TSH    Vitamin D 25 Hydroxy    Lipase      2.  Drug therapy  POCT Rapid Drug Screen            Plan:   More than 50% of the time was spent counseling and coordinating care for a total time of 30 min face to face. Gallbladder ultrasound. Return for fasting labs in [address]. Begin Protonix 20 mg daily. Follow-up in 4 weeks or sooner if needed. PDMP Monitoring:    Last PDMP Daniel as Reviewed:  Review User Review Instant Review Result            Urine Drug Screenings (1 yr)    No resulted procedures found. Medication Contract and Consent for Opioid Use Documents Filed       Patient Documents       Type of Document Status Date Received Received By Description    Medication Contract Received 11/8/2022 10:15 AM Hilario Ji 11.08.22                     Patient given educational materials -see patient instructions. Discussed use, benefit, and side effects of prescribed medications. All patient questions answered. Pt voiced understanding. Reviewed health maintenance. Instructed to continue currentmedications, diet and exercise. Patient agreed with treatment plan. Follow up as directed. MEDICATIONS:  Orders Placed This Encounter   Medications    pantoprazole (PROTONIX) 20 MG tablet     Sig: Take 1 tablet by mouth daily     Dispense:  30 tablet     Refill:  3         ORDERS:  Orders Placed This Encounter   Procedures    US GALLBLADDER RUQ    CBC with Auto Differential    Comprehensive Metabolic Panel    Lipid Panel    Hemoglobin A1C    TSH    Vitamin D 25 Hydroxy    Lipase    POCT Rapid Drug Screen       Follow-up:  Return in about 4 weeks (around 12/6/2022) for in office. PATIENT INSTRUCTIONS:  Patient Instructions   Gallbladder ultrasound. Return for fasting labs in [address]. Begin Protonix 20 mg daily. Follow-up in 4 weeks or sooner if needed. Electronically signed by RODERICK Neri on 11/8/2022 at 10:50 AM    EMR Dragon/transcription disclaimer:  Much of thisencounter note is electronic transcription/translation of spoken language to printed texts.   The electronic translation of spoken language may be erroneous, or at times, nonsensical words or phrases may be inadvertentlytranscribed.   Although I have reviewed the note for such errors, some may still exist.

## 2022-11-08 NOTE — PATIENT INSTRUCTIONS
Gallbladder ultrasound. Return for fasting labs in [address]. Begin Protonix 20 mg daily. Follow-up in 4 weeks or sooner if needed.

## 2022-11-08 NOTE — PROGRESS NOTES
[facility] Neurosurgery  Office Visit      Chief Complaint   Patient presents with    Follow-up     Eval after PT     Results     MRI Lumbar Spine (9/22/2022)    Back Pain     Patient states she is still currently going to PT but doesn't notice any changes in her symptoms. Numbness     Patient denies any symptoms of numbness/tingling in her BLE. 11/8/2022:  Mrs. Sera Sher returns to the clinic for follow up after her PT treatments. She states that she cannot tell if PT is helping or not. She has left sided low back pain. Denies any bowel or bladder issues. She states the pain is not severe. Feels that the numbness has resolved. HISTORY OF PRESENT ILLNESS:    Carlin Camacho is a 40 y.o. female with severe DDD at L5-S1 who presents with back pain that began in June and has gradually worsened. She has had one previous episode of back pain like this that occurred in February 2022 and dissipated after a round of steroids, denies any specific contributing event either occurrence. The pain does radiate into the right buttock, hip and extending down posterior thigh and calf with numbness in her 5th digit of her foot. She states this pain was also present on the left when the pain first began. Her pain is mostly located right buttock, hip, and leg. She does not report noticeable weakness, but does not that she has been falling more frequently lately. Her pain is not changed when going from a seated to standing position. Her pain is improved with walking. Her pain is worse with prolonged standing or walking. Her pain is improved when lying flat. Overall, indicative that the patient does have a mechanical nature to their pain. She states that 20% of her pain is located in the back and 80% is leg pain.     The patient has underwent a non-operative treatment course that has included:  NSAIDs (Diclofenac with no relief, meloxicam with no improvement- takes ibuprofen when she is going to be working, works as a PACU nurse which requires standing)  Muscle Relaxers (Flexeril with no relief, tizanidine moderate relief- helps her sleep)  Oral Steroids (maximum improvement in February when she had back pain; however, no relief this time)    She denies previous spinal surgeries. Of note she does not use tobacco and does take blood thinning medications (ibuprofen PRN pain, at least 3 times a week). Past Medical History:   Diagnosis Date    Abnormal Pap smear of cervix            Past Surgical History:   Procedure Laterality Date     SECTION         Current Outpatient Medications   Medication Sig Dispense Refill    pantoprazole (PROTONIX) 20 MG tablet Take 1 tablet by mouth daily 30 tablet 3    gabapentin (NEURONTIN) 100 MG capsule Take 1 capsule by mouth 3 times daily for 30 days. 90 capsule 0    tiZANidine (ZANAFLEX) 4 MG tablet Take 1 tablet by mouth 3 times daily as needed (back pain) 90 tablet 0    meloxicam (MOBIC) 15 MG tablet Take 1 tablet by mouth daily as needed for Pain 90 tablet 1    diclofenac (VOLTAREN) 50 MG EC tablet Take 1 tablet by mouth in the morning and 1 tablet at noon and 1 tablet in the evening. Take with meals. 30 tablet 5    cyclobenzaprine (FLEXERIL) 5 MG tablet TAKE 1 TABLET BY MOUTH 3 TIMES A DAY AS NEEDED FOR MUSCLE SPASMS 30 tablet 5    cetirizine (ZYRTEC) 10 MG tablet Take 10 mg by mouth daily        No current facility-administered medications for this visit. Allergies:  Patient has no known allergies.     Social History:   Social History     Tobacco Use   Smoking Status Never   Smokeless Tobacco Never     Social History     Substance and Sexual Activity   Alcohol Use Yes    Alcohol/week: 0.0 standard drinks    Comment: rare         Family History:   Family History   Problem Relation Age of Onset    Stroke Maternal Grandfather     Stroke Maternal Grandmother     Stroke Paternal Uncle     Stroke Paternal Aunt     Cancer Neg Hx     Diabetes Neg Hx     Heart Attack Neg Hx        REVIEW OF SYSTEMS:  Constitutional: Negative. HENT: Negative. Eyes: Negative. Respiratory: Negative. Gastrointestinal: Negative. Genitourinary: Negative. Musculoskeletal:  Positive for back pain, joint pain and myalgias. Skin: Negative. Neurological:  Positive for tingling. Endo/Heme/Allergies: Negative. Psychiatric/Behavioral: Negative. PHYSICAL EXAM:  Vitals:    11/08/22 1311   BP: 128/88   Pulse: 82   Resp: 18   SpO2: 97%     Constitutional: appears well-developed and well-nourished. Eyes - conjunctiva normal.  Pupils react to light  Ear, nose, throat - hearing intact to finger rub, No scars, masses, or lesions over external nose or ears, no atrophy oftongue  Neck- symmetric, no masses noted, no jugular vein distension  Respiration- chest wall appears symmetric, good expansion, normal effort without use of accessory muscles  Musculoskeletal - no significant wasting of muscles noted, no bony deformities, gait no gross ataxia  Extremities- no clubbing, cyanosis oredema  Skin - warm, dry, and intact. No rash, erythema, or pallor. Psychiatric - mood, affect, and behavior appear normal.     Neurologic Examination  Awake, Alert and oriented x 4  Normal speech pattern, following commands    Motor:  RIGHT:     iliopsoas 5/5    knee flexor 5/5    knee extension 5/5    EHL 5/5   dorsiflexion 5/5    plantar flexion 5/5    LEFT:       iliopsoas 5/5    knee flexor 5/5    knee extension 5/5    EHL 5/5   dorsiflexion 5/5    plantar flexion 5/5    Deficit to pinprick sensation on right lateral lower leg  Reflexes are 2+ BLE  Normal Gait pattern  Can stand on toes      DATA and IMAGING:    Nursing/pcp notes, imaging, labs, and vitals reviewed.      PT,OT and/or speech notes reviewed    Lab Results   Component Value Date    WBC 9.6 03/04/2019    HGB 11.1 (L) 03/04/2019    HCT 35.2 (L) 03/04/2019    MCV 87.6 03/04/2019     03/04/2019     Lab Results   Component Value Date     03/04/2019    K 4.4 03/04/2019     03/04/2019    CO2 25 03/04/2019    BUN 8 03/04/2019    CREATININE 0.6 03/04/2019    GLUCOSE 109 04/12/2022    CALCIUM 9.2 03/04/2019    PROT 7.6 03/04/2019    LABALBU 4.1 03/04/2019    BILITOT <0.2 03/04/2019    ALKPHOS 121 (H) 03/04/2019    AST 14 03/04/2019    ALT 10 03/04/2019    LABGLOM >60 03/04/2019   No results found for: INR, PROTIME  Narrative   NO PRIOR REPORT AVAILABLE   Exam: MRI OF THE LUMBAR SPINE WITHOUT AND WITH CONTRAST   Clinical data: Degenerative disc disease at L5-S1 level. Bilateral sciatica. Gait instability. Technique: Multiplanar multisequence MRI of the lumbar spine without and with contrast. Axial imaging was performed through the L1 through S1 disc levels. Contrast used:MultiHance. Amount: 20 mL. Prior studies: No prior studies submitted. Findings: For the purposes of this examination it was assumed that there are five non rib bearing lumbar type vertebrae with the inferior labeled L5. Normal lordotic curvature. No acute fracture or subluxation. Normal vertebral body and intervertebral    disc heights. Normal marrow signal of the vertebrae. Conus medullaris in normal anatomic position. No abnormal extra-axial masses. Soft tissues are unremarkable. T1 and T2 hyperintense hemangiomas are within L2 and L5. There are Modic type 2    degenerative endplate changes at F1-N1. Multilevel facet arthropathy. Level by leveldisease is present as follows:   T12-L1: There is no abnormally positioned disc material. There is no significant spinal canal, lateral recess, neural foramina compromise, or nerve impingement. L1-L2: There is no abnormally positioned disc material. There is no significant spinal canal, lateral recess, neural foramina compromise, or nerve impingement. L2-L3: There is no abnormally positioned disc material. There is no significant spinal canal, lateral recess, neural foramina compromise, or nerve impingement. L3-L4: There is no abnormally positioned disc material. There is no significant spinal canal, lateral recess, neural foramina compromise, or nerve impingement. L4-L5: There is no abnormally positioned disc material. There is no significant spinal canal, lateral recess, neural foramina compromise, or nerve impingement. L5-S1:  Disc height narrowing and multilevel disc desiccation. A nonenhancing extruded disc is at L5-S1 measuring 1.5 x 1.0 cm. The disc compresses the thecal sac. The extruded disc causes spinal canal stenosis with an AP diameter measuring 7 mm. Post contrast imaging demonstrates no abnormal cord, leptomeningeal or soft tissue enhancement. Impression   1. L5-S1:  Disc height narrowing and multilevel disc desiccation. A nonenhancing extruded disc is at L5-S1 measuring 1.5 x 1.0 cm. The disc compresses the thecal sac. The extruded disc causes spinal canal stenosis with an AP diameter measuring 7 mm. Recommendation:    Follow up as clinically indicated. Electronically Signed by Vinh Lezama MD at 23-Sep-2022 09:36:18 AM            Narrative   EXAMINATION:  XR LUMBAR SPINE (2-3 VIEWS)  2/28/2022 3:40 PM   HISTORY: Lumbar pain, chronic. COMPARISON: No comparison study. TECHNIQUE: 3 views were obtained. FINDINGS: There is mild thoracolumbar scoliosis to the left versus   positioning artifact. The Kenny angle is 9 degrees. There is mild   narrowing of the L3-4 disc space. There is moderate to severe   narrowing of the L5-S1 disc space. There is facet arthropathy at   L3-S1. The pedicles are intact. No acute fracture is seen. Impression   1. Mild thoracolumbar scoliosis to the left versus positioning   artifact. 2. Disc narrowing and facet arthropathy, as described. Signed by Dr Sergei Carlson         I have personally reviewed these images and my interpretation is:  DDD at L5-S1.   L5-S1: There is a large central disc extrusion that results in moderate to severe canal stenosis and comes into contact with bilateral S1 nerve roots. No scoliosis or apparent compression fractures. ASSESSMENT:    Amrita Hagan is a 40 y.o. female with severe DDD at L5-S1 who presents with back pain that began in June and has gradually worsened. ICD-10-CM    1. Herniation of intervertebral disc between L5 and S1  M51.27       2. Chronic bilateral low back pain with right-sided sciatica  M54.41     G89.29       3. Numbness of toes  R20.0             PLAN:  We have again discussed and reviewed the results of the MRI and XR of the lumbar spine with Ms. Urban Pickett at length. We explained that she has a large herniated disc at L5-S1 that is coming into contact with bilateral S1 nerve roots, likely causing her symptoms, however, her pain seems to be somewhat improved, her exam is definitely improved, and because of that we will hold off on surgery.  She is ok with this.    -Follow up as needed for now, she knows to call with any concerns or return of pain           Yasmine Karimi, DO

## 2022-11-09 ENCOUNTER — HOSPITAL ENCOUNTER (OUTPATIENT)
Dept: PHYSICAL THERAPY | Age: 37
Setting detail: THERAPIES SERIES
Discharge: HOME | End: 2022-11-09
Payer: COMMERCIAL

## 2022-11-09 PROCEDURE — 97012 MECHANICAL TRACTION THERAPY: CPT

## 2022-11-09 PROCEDURE — 97110 THERAPEUTIC EXERCISES: CPT

## 2022-11-09 ASSESSMENT — PAIN SCALES - GENERAL: PAINLEVEL_OUTOF10: 2

## 2022-11-09 ASSESSMENT — PAIN DESCRIPTION - DESCRIPTORS: DESCRIPTORS: SORE;NUMBNESS

## 2022-11-09 ASSESSMENT — PAIN DESCRIPTION - LOCATION: LOCATION: BACK

## 2022-11-09 ASSESSMENT — PAIN DESCRIPTION - PAIN TYPE: TYPE: CHRONIC PAIN

## 2022-11-09 ASSESSMENT — PAIN DESCRIPTION - ORIENTATION: ORIENTATION: LEFT;LOWER

## 2022-11-09 NOTE — PROGRESS NOTES
Physical Therapy  Daily Treatment Note  Date: 2022  Patient Name: Anupam Mireles  MRN: [id number]     :   1985    Subjective:      PT Visit Information  PT Insurance Information: [facility] [place])  Total # of Visits Approved: 12  Total # of Visits to Date: 8  Progress Note Due Date: 22  Subjective: I am doing good. About the same as yesterday. My L hip is still feeling about the same    Pain Screening  Patient Currently in Pain: Yes  Pain Level: 2  Pain Type: Chronic pain  Pain Location: Back  Pain Orientation: Left; Lower  Pain Descriptors: Sore;Numbness       Treatment Activities:   Exercises  Exercise 1: ++monitor for signs of right leg radicular symptoms  Exercise 2: supine lower trunk stretch to left--4 reps, 15 sec hold  Exercise 3: supine left quadratus stretch to right--4 reps, 15 sec hold  Exercise 4: isometric resisted right hip extension from SKTC--8 reps, 5\" hold- not today  Exercise 5: isometric resisted left hip flexion at 90/90--8 reps, 5\" hold- not today  Exercise 6: pelvic tilts with folded towel under sacrum--15 reps  Exercise 7: abdominal series maintaining pelvic tilt (arms, legs, arms/legs)  x 10 ea  Exercise 8: contract/relax bilateral hamstring stretch--3 reps, 15 sec hold at end range  Exercise 9: axial traction to legs bilaterally--4 reps, 10\" hold  Exercise 10: double knee to chest stretch-- 5\" x 10 reps  Exercise 11: prone on elbows, up to 3 mins  Exercise 12: repeated sit to stand maintaining abdominal contraction  x 10  Exercise 13: standing back extension  x 10  Exercise 14: standing glute sets, feet shoulder width apart  3\" x 15  Exercise 15: Paloff press--15 reps bilaterally (GREEN)  Exercise 16: 's bow  X 10  Exercise 17: standing sidebend stretch to left  X 10  Exercise 18: ball roll up wall  X 5 WITH 5 SECOND HOLD  Exercise 19: trial pelvic traction (start at 70# up to half body weight --100#)  90 POUNDS OF PULL  x 20 MINUTE  Exercise 20: 10/21  HEP issued Assessment:   Conditions Requiring Skilled Therapeutic Intervention  Body Structures, Functions, Activity Limitations Requiring Skilled Therapeutic Intervention: Decreased functional mobility   Assessment: Patient tolerates exercises and traction well this date having continued low back and L hip/LE symptoms that remains unchanged post session. Some cues required during exercises for technique and reports no increased pain. Traction performed first this date due to multiple people in clinic needing to use it. Some increased soreness reported post traction but she is able to complete exercises per flow sheet. Will continue to progress as tolerated. Goals:Short Term Goals  Short Term Goal 1: 2-3 weeks  STG 1 Current Status[de-identified] Patient to be independent with HEP. Short Term Goal 2: Patient to be independent with trial use of lift in left shoe to balance pelvic height. Short Term Goal 3: Patient to report less difficulty with carrying laundry basket and groceries. Long Term Goals  Time Frame for Long Term Goals : 4-6 weeks  Long Term Goal 1: Patient to have >= 25 degrees left sidebending of trunk. Long Term Goal 2: Patient to report decreased numbness in right little toe. Long Term Goal 3: Patient to score <= 5% impairment on the Oswestry Disability Questionnaire.     Plan:    Physcial Therapy Plan  Plan weeks: 4-6 weeks  Current Treatment Recommendations: Strengthening, ROM, Pain management, Home exercise program, Patient/Caregiver education & training, Manual Therapy - Soft Tissue Mobilization, Manual Therapy - Joint Manipulation, Modalities  Timed Code Treatment Minutes: 42 Minutes     Therapy Time   Individual Concurrent Group Co-treatment   Time In 1300         Time Out 1402         Minutes 62         Timed Code Treatment Minutes: 42 Minutes     Electronically signed by Karel Corbett PTA on 11/9/2022 at 4:10 PM

## 2022-11-17 ENCOUNTER — HOSPITAL ENCOUNTER (OUTPATIENT)
Dept: PHYSICAL THERAPY | Age: 37
Setting detail: THERAPIES SERIES
Discharge: HOME | End: 2022-11-17
Payer: COMMERCIAL

## 2022-11-17 ENCOUNTER — HOSPITAL ENCOUNTER (OUTPATIENT)
Dept: ULTRASOUND IMAGING | Age: 37
Discharge: HOME | End: 2022-11-17
Payer: COMMERCIAL

## 2022-11-17 DIAGNOSIS — R10.84 GENERALIZED ABDOMINAL PAIN: ICD-10-CM

## 2022-11-17 PROCEDURE — 97110 THERAPEUTIC EXERCISES: CPT

## 2022-11-17 PROCEDURE — 76705 ECHO EXAM OF ABDOMEN: CPT | Performed by: RADIOLOGY

## 2022-11-17 PROCEDURE — 76705 ECHO EXAM OF ABDOMEN: CPT

## 2022-11-17 ASSESSMENT — PAIN SCALES - GENERAL: PAINLEVEL_OUTOF10: 1

## 2022-11-17 ASSESSMENT — PAIN DESCRIPTION - FREQUENCY: FREQUENCY: CONTINUOUS

## 2022-11-17 ASSESSMENT — PAIN DESCRIPTION - ORIENTATION: ORIENTATION: LEFT

## 2022-11-17 ASSESSMENT — PAIN DESCRIPTION - DESCRIPTORS: DESCRIPTORS: SORE;TIGHTNESS

## 2022-11-17 ASSESSMENT — PAIN DESCRIPTION - LOCATION: LOCATION: BACK;BUTTOCKS

## 2022-11-17 NOTE — PROGRESS NOTES
Physical Therapy: Daily Note/Reassessment/Discharge   Patient: Brittany Ortiz (44 y.o. female)   Examination Date: 2022  No data recorded  No data recorded   :  1985 # of Visits since [facility]:   9   MRN: [id number]  CSN: [id number] Start of Care Date:   10/13/2022   Insurance: Payor: Merritt Worthy / Plan: Tushar Banks [address] / Product Type: *No Product type* /   Insurance ID: [id number] - ([facility]) Secondary Insurance (if applicable):    Referring Physician: RODERICK Guillen APRN   PCP: RODERICK Curtis Visits to Date/Visits Approved:     No Show/Cancelled Appts:   /       Medical Diagnosis: Lumbago with sciatica, right side [M54.41]  Other chronic pain [G89.29]  Anesthesia of skin [R20.0]  Other intervertebral disc degeneration, lumbar region [M51.36]  Other intervertebral disc displacement, lumbosacral region [M51.27]    No data recorded      SUBJECTIVE EXAMINATION   Pain Level: Pain Screening  Pain Assessment: 0-10  Pain Level: 1  Pain Location: Back, Buttocks  Pain Orientation: Left  Pain Descriptors: Sore, Tightness  Pain Frequency: Continuous    Patient Comments: Subjective: Patient states she isn't sure if therapy is helping, \"it's not gotten worse. \" She saw her neurosurgeon last week and she elected to not undergo surgery. She did have some shooting pains intermittently, constant pain left buttock and hip area but not terrible.     HEP Compliance:           OBJECTIVE EXAMINATION   Restrictions:  No data recorded No data recorded No data recorded                TREATMENT     Exercises:      Treatment Reasoning    Exercise 1: ++monitor for signs of right leg radicular symptoms  Exercise 2: supine lower trunk stretch to left--4 reps, 15 sec hold  Exercise 3: supine left quadratus stretch to right--4 reps, 15 sec hold  Exercise 4: isometric resisted right hip extension from SKTC--8 reps, 5\" hold- not today  Exercise 5: isometric resisted left hip flexion at 90/90--8 reps, 5\" hold- not today  Exercise 6: pelvic tilts with folded towel under sacrum--20 reps  Exercise 7: abdominal series maintaining pelvic tilt (arms, legs, arms/legs)  x 10 ea  Exercise 8: contract/relax bilateral hamstring stretch--3 reps, 15 sec hold at end range  Exercise 9: axial traction to legs bilaterally--4 reps, 10\" hold  Exercise 10: double knee to chest stretch-- 5\" x 10 reps  Exercise 11: prone on elbows, up to 3 mins---prone press ups 2 x 10  Exercise 12: repeated sit to stand maintaining abdominal contraction  x 10  Exercise 13: standing back extension  x 10  Exercise 14: standing glute sets, feet shoulder width apart  3\" x 15  Exercise 15: Paloff press--15 reps bilaterally (GREEN)  Exercise 16: 's bow  X 10  Exercise 17: standing sidebend stretch to left  X 10  Exercise 18: ball roll up wall  X 5 WITH 5 SECOND HOLD  Exercise 19: trial pelvic traction (start at 70# up to half body weight --100#)  90 POUNDS OF PULL  x 20 MINUTE--not today  Exercise 20: 10/21  HEP issued    Limitations addressed: Mobility, Strength, Pain modulation  Therapist provided: Verbal cuing                     Pt Education:         ASSESSMENT     Assessment: Assessment: Ms. Robbie Castaneda appears to have made progress since the initiation of PT. The numbness in her right little toe has subsided, she scored more favorably on the Oswestry Disability Questionnaire, ability to carry groceries and laundry basket is improved. She has a heel lift and has been wearing it in her shoe. She is reporting longer distances tolerated for walking. She is still having aching near left buttock and intermittent shooting pain in her leg, but not as frequent as it had been. She saw her surgeon last week and possible surgery was proposed, but she is not amenable to that at this time. She has a HEP, but by her admission she has not been regularly performing them and promises she will continue to work on her stretches.  Plan on discharging her from PT today, with her to continue independently. Body Structures, Functions, Activity Limitations Requiring Skilled Therapeutic Intervention: Decreased functional mobility     Post-Treatment Pain Level: Activity Tolerance: Patient tolerated evaluation without incident    Therapy Prognosis: Good       GOALS      Short Term Goals Completed by 2-3 weeks Current Status Goal Status   Patient to be independent with HEP. 11/17/22 Patient has written HEP, has not been consistently been performing them. She has been walking longer distances for exercise. Partially met   Patient to be independent with trial use of lift in left shoe to balance pelvic height. 11/17/22 Patient is independent with heel lift in left shoe. Met   Patient to report less difficulty with carrying laundry basket and groceries. 11/17/22 Patient is reporting less difficulty with carrying laundry basket and groceries. Met                                                               Long Term Goals Completed by 4-6 weeks Current Status Goal Status   Patient to have >= 25 degrees left sidebending of trunk. 11/17/22 Patient has 35 degrees lumbar sidebending to left. Met   Patient to report decreased numbness in right little toe. 11/17/22 Patient no longer complaining of numbness in right little toe. Met   Patient to score <= 5% impairment on the Oswestry Disability Questionnaire. 11/17/22 Patient scored 10% impairment on the Oswestry Disability.  Partially met                                                                TREATMENT PLAN   Plan Frequency: 2 x per week  Plan weeks: 4-6 weeks  Current Treatment Recommendations: Strengthening, ROM, Pain management, Home exercise program, Patient/Caregiver education & training, Manual Therapy - Soft Tissue Mobilization, Manual Therapy - Joint Manipulation, Modalities   Requires PT Follow-Up: No       Therapy Time  Individual Time In: 0339       Individual Time Out: 1435  Minutes: 37  Timed Code Treatment Minutes: 37 Minutes     Electronically signed by Jason Flynn PT  on 11/17/2022 at 3:58 PM   POC NOTE

## 2022-11-22 DIAGNOSIS — K80.20 GALLSTONES: Primary | ICD-10-CM

## 2022-11-23 DIAGNOSIS — K80.20 GALLSTONES: Primary | ICD-10-CM

## 2022-12-01 ENCOUNTER — OFFICE VISIT (OUTPATIENT)
Dept: SURGERY | Age: 37
End: 2022-12-01
Payer: COMMERCIAL

## 2022-12-01 VITALS
HEIGHT: 70 IN | DIASTOLIC BLOOD PRESSURE: 70 MMHG | WEIGHT: 228.8 LBS | TEMPERATURE: 98 F | BODY MASS INDEX: 32.75 KG/M2 | SYSTOLIC BLOOD PRESSURE: 120 MMHG

## 2022-12-01 DIAGNOSIS — K80.20 SYMPTOMATIC CHOLELITHIASIS: Primary | ICD-10-CM

## 2022-12-01 PROCEDURE — 99203 OFFICE O/P NEW LOW 30 MIN: CPT | Performed by: SURGERY

## 2022-12-01 RX ORDER — IBUPROFEN 200 MG
200 TABLET ORAL EVERY 6 HOURS PRN
COMMUNITY

## 2022-12-01 NOTE — PROGRESS NOTES
Ms. Fercho Varghese is a 40year old female who presents with a complaint of abdominal pain. She reports that she has had issues with pain intermittently for about 5 years. The pain is associated with nausea and vomiting. The pain is in the upper abdomen, goes to the back, and up in to the chest. The pain has worsened over time. There is moderate dull pain that lasts all day. There are then severe times that are burning and 'feels like she is dying,' but this only lasts 10-15 minutes. The pain seems to wake her up at night a few hours after going to sleep. She does not note that the pain is usually associated with eating, but was once after eating a Whopper. She reports rare diarrhea, she usually has a BM every 3 days. She tried taking tums and thought they helped at first, but they do not help anymore. She had an US that showed gallstones and some mild gallbladder wall thickening. Past Medical History:   Diagnosis Date    Abnormal Pap smear of cervix         Allergic rhinitis     Lumbar herniated disc      Past Surgical History:   Procedure Laterality Date     SECTION           Current Outpatient Medications   Medication Sig Dispense Refill    ibuprofen (ADVIL;MOTRIN) 200 MG tablet Take 200 mg by mouth every 6 hours as needed for Pain      pantoprazole (PROTONIX) 20 MG tablet Take 1 tablet by mouth daily 30 tablet 3    gabapentin (NEURONTIN) 100 MG capsule Take 1 capsule by mouth 3 times daily for 30 days. 90 capsule 0    tiZANidine (ZANAFLEX) 4 MG tablet Take 1 tablet by mouth 3 times daily as needed (back pain) 90 tablet 0    meloxicam (MOBIC) 15 MG tablet Take 1 tablet by mouth daily as needed for Pain 90 tablet 1    diclofenac (VOLTAREN) 50 MG EC tablet Take 1 tablet by mouth in the morning and 1 tablet at noon and 1 tablet in the evening. Take with meals.  30 tablet 5    cyclobenzaprine (FLEXERIL) 5 MG tablet TAKE 1 TABLET BY MOUTH 3 TIMES A DAY AS NEEDED FOR MUSCLE SPASMS 30 tablet 5 cetirizine (ZYRTEC) 10 MG tablet Take 10 mg by mouth daily        No current facility-administered medications for this visit. Allergies: Patient has no known allergies. Family History   Problem Relation Age of Onset    Cancer Paternal Aunt         skin    Stroke Paternal Aunt     Stroke Paternal Uncle     Stroke Maternal Grandmother     Stroke Maternal Grandfather     Cancer Paternal Grandmother         skin    Diabetes Neg Hx     Heart Attack Neg Hx        Social History     Tobacco Use    Smoking status: Never    Smokeless tobacco: Never   Substance Use Topics    Alcohol use: Yes     Alcohol/week: 0.0 standard drinks     Comment: rare       Review of Systems   Constitutional:  Negative for chills and fever. HENT:  Negative for congestion and sore throat. Eyes:  Negative for pain and redness. Respiratory:  Negative for cough and shortness of breath. Cardiovascular:  Negative for chest pain and palpitations. Gastrointestinal:  Positive for abdominal distention, abdominal pain, constipation, diarrhea, nausea and vomiting. Endocrine: Negative for polydipsia and polyphagia. Genitourinary:  Negative for dysuria and hematuria. Musculoskeletal:  Positive for back pain. Negative for arthralgias. Skin:  Negative for rash and wound. Neurological:  Negative for dizziness and headaches. Psychiatric/Behavioral:  Negative for confusion and dysphoric mood. Physical Exam  Vitals reviewed. Constitutional:       General: She is not in acute distress. Appearance: She is not ill-appearing. HENT:      Head: Normocephalic and atraumatic. Nose: Nose normal.   Eyes:      General: No scleral icterus. Pupils: Pupils are equal, round, and reactive to light. Cardiovascular:      Rate and Rhythm: Normal rate and regular rhythm. Pulmonary:      Effort: Pulmonary effort is normal. No respiratory distress. Abdominal:      General: There is no distension. Palpations: Abdomen is soft. There is no mass. Tenderness: There is abdominal tenderness (mild upper right). There is no guarding. Hernia: No hernia is present. Musculoskeletal:         General: No swelling or deformity. Cervical back: Neck supple. No rigidity. Skin:     General: Skin is warm and dry. Coloration: Skin is not jaundiced. Neurological:      General: No focal deficit present. Mental Status: She is alert. Mental status is at baseline. Psychiatric:         Mood and Affect: Mood normal.         Behavior: Behavior normal.       NO PRIOR REPORT AVAILABLE   Exam: ULTRASOUND OF THE ABDOMEN LIMITED, RIGHT UPPER QUADRANT   Clinical data: Generalized abdominal pain. Technique: Real-time grayscale imaging of the right upper quadrant was performed. Images supplemented with color Doppler technique. Prior studies: No prior studies submitted. Findings: The liver demonstrates normal echogenicity, without evidence of a discrete hepatic mass lesion. No intrahepatic biliary distention. The gallbladder demonstrates a large shadowing stone. Gallbladder wall thickness of0.4 cm. No sludge. The common    bile duct is normal in caliber measuring 0.4cm. The right kidney [id number] x 5.2 x 4.3 cm. Normal echogenicity and cortical thickness are preserved. No evidence of renal masses. No evidence of renal calculi. There is no evidence of hydronephrosis   The pancreas is unremarkable to the extent visualized. Imaged portion of the aorta demonstrates no acute pathology measures 2.5 cm. Visualized portion of the inferior vena cava is unremarkable. No ascites is evident. Impression   Findings consistent with cholecystitis and cholelithiasis. Recommendation:   Follow up as clinically indicated.     Electronically Signed by Sudha Gonzáles MD at 17-Nov-2022 02:50:24 PM EST           Assessment and plan:  40year old female with abdominal pain, nausea and vomiting  Discussed symptomatic cholelithiasis with the patient. Discussed reasoning for cholecystectomy in the setting of symptomatic cholelithiasis as well as the risks and benefits or laparoscopic cholecystectomy, including but not limited to, bleeding, infection, injury to surrounding structures, need for open surgery, and post operative diarrhea. The patient expressed understanding and would like to proceed with surgery.     Lowell Stovall MD  12/1/2022  10:38 AM

## 2022-12-01 NOTE — H&P (VIEW-ONLY)
Ms. Santos is a 40year old female who presents with a complaint of abdominal pain. She reports that she has had issues with pain intermittently for about 5 years. The pain is associated with nausea and vomiting. The pain is in the upper abdomen, goes to the back, and up in to the chest. The pain has worsened over time. There is moderate dull pain that lasts all day. There are then severe times that are burning and 'feels like she is dying,' but this only lasts 10-15 minutes. The pain seems to wake her up at night a few hours after going to sleep. She does not note that the pain is usually associated with eating, but was once after eating a Whopper. She reports rare diarrhea, she usually has a BM every 3 days. She tried taking tums and thought they helped at first, but they do not help anymore. She had an US that showed gallstones and some mild gallbladder wall thickening. Past Medical History:   Diagnosis Date    Abnormal Pap smear of cervix         Allergic rhinitis     Lumbar herniated disc      Past Surgical History:   Procedure Laterality Date     SECTION           Current Outpatient Medications   Medication Sig Dispense Refill    ibuprofen (ADVIL;MOTRIN) 200 MG tablet Take 200 mg by mouth every 6 hours as needed for Pain      pantoprazole (PROTONIX) 20 MG tablet Take 1 tablet by mouth daily 30 tablet 3    gabapentin (NEURONTIN) 100 MG capsule Take 1 capsule by mouth 3 times daily for 30 days. 90 capsule 0    tiZANidine (ZANAFLEX) 4 MG tablet Take 1 tablet by mouth 3 times daily as needed (back pain) 90 tablet 0    meloxicam (MOBIC) 15 MG tablet Take 1 tablet by mouth daily as needed for Pain 90 tablet 1    diclofenac (VOLTAREN) 50 MG EC tablet Take 1 tablet by mouth in the morning and 1 tablet at noon and 1 tablet in the evening. Take with meals.  30 tablet 5    cyclobenzaprine (FLEXERIL) 5 MG tablet TAKE 1 TABLET BY MOUTH 3 TIMES A DAY AS NEEDED FOR MUSCLE SPASMS 30 tablet 5 cetirizine (ZYRTEC) 10 MG tablet Take 10 mg by mouth daily        No current facility-administered medications for this visit. Allergies: Patient has no known allergies. Family History   Problem Relation Age of Onset    Cancer Paternal Aunt         skin    Stroke Paternal Aunt     Stroke Paternal Uncle     Stroke Maternal Grandmother     Stroke Maternal Grandfather     Cancer Paternal Grandmother         skin    Diabetes Neg Hx     Heart Attack Neg Hx        Social History     Tobacco Use    Smoking status: Never    Smokeless tobacco: Never   Substance Use Topics    Alcohol use: Yes     Alcohol/week: 0.0 standard drinks     Comment: rare       Review of Systems   Constitutional:  Negative for chills and fever. HENT:  Negative for congestion and sore throat. Eyes:  Negative for pain and redness. Respiratory:  Negative for cough and shortness of breath. Cardiovascular:  Negative for chest pain and palpitations. Gastrointestinal:  Positive for abdominal distention, abdominal pain, constipation, diarrhea, nausea and vomiting. Endocrine: Negative for polydipsia and polyphagia. Genitourinary:  Negative for dysuria and hematuria. Musculoskeletal:  Positive for back pain. Negative for arthralgias. Skin:  Negative for rash and wound. Neurological:  Negative for dizziness and headaches. Psychiatric/Behavioral:  Negative for confusion and dysphoric mood. Physical Exam  Vitals reviewed. Constitutional:       General: She is not in acute distress. Appearance: She is not ill-appearing. HENT:      Head: Normocephalic and atraumatic. Nose: Nose normal.   Eyes:      General: No scleral icterus. Pupils: Pupils are equal, round, and reactive to light. Cardiovascular:      Rate and Rhythm: Normal rate and regular rhythm. Pulmonary:      Effort: Pulmonary effort is normal. No respiratory distress. Abdominal:      General: There is no distension. Palpations: Abdomen is soft. There is no mass. Tenderness: There is abdominal tenderness (mild upper right). There is no guarding. Hernia: No hernia is present. Musculoskeletal:         General: No swelling or deformity. Cervical back: Neck supple. No rigidity. Skin:     General: Skin is warm and dry. Coloration: Skin is not jaundiced. Neurological:      General: No focal deficit present. Mental Status: She is alert. Mental status is at baseline. Psychiatric:         Mood and Affect: Mood normal.         Behavior: Behavior normal.       NO PRIOR REPORT AVAILABLE   Exam: ULTRASOUND OF THE ABDOMEN LIMITED, RIGHT UPPER QUADRANT   Clinical data: Generalized abdominal pain. Technique: Real-time grayscale imaging of the right upper quadrant was performed. Images supplemented with color Doppler technique. Prior studies: No prior studies submitted. Findings: The liver demonstrates normal echogenicity, without evidence of a discrete hepatic mass lesion. No intrahepatic biliary distention. The gallbladder demonstrates a large shadowing stone. Gallbladder wall thickness of0.4 cm. No sludge. The common    bile duct is normal in caliber measuring 0.4cm. The right kidney [id number] x 5.2 x 4.3 cm. Normal echogenicity and cortical thickness are preserved. No evidence of renal masses. No evidence of renal calculi. There is no evidence of hydronephrosis   The pancreas is unremarkable to the extent visualized. Imaged portion of the aorta demonstrates no acute pathology measures 2.5 cm. Visualized portion of the inferior vena cava is unremarkable. No ascites is evident. Impression   Findings consistent with cholecystitis and cholelithiasis. Recommendation:   Follow up as clinically indicated.     Electronically Signed by Rajani Fabian MD at 17-Nov-2022 02:50:24 PM EST           Assessment and plan:  40year old female with abdominal pain, nausea and vomiting  Discussed symptomatic cholelithiasis with the patient. Discussed reasoning for cholecystectomy in the setting of symptomatic cholelithiasis as well as the risks and benefits or laparoscopic cholecystectomy, including but not limited to, bleeding, infection, injury to surrounding structures, need for open surgery, and post operative diarrhea. The patient expressed understanding and would like to proceed with surgery.     Ron Magallon MD  12/1/2022  10:38 AM

## 2022-12-01 NOTE — LETTER
Preop Orders:     Patient: Shy Chawla  : 1985    Hospital: [facility]    Admitting Physician:  Dr. Delmis Oropeza    Diagnosis: symptomatic cholelithiasis    Procedure: laparoscopic robot assisted cholecystectomy    Time: 1 hour    Anesthesia: General    Admission: Outpatient     Date: to be scheduled    Labs: per anesthesia     Special Instructions:  none    Cardiac Clearance:  none    Special Equipment:  none    Pre-Op Meds:  ancef    Latex Allergy: no    Beta Blocker: no    Medication Instructions: none    Post op visit: 2 weeks post op      Electronically signed by Frank Ramsey MD   On 22   @ 10:39 AM

## 2022-12-07 DIAGNOSIS — M54.32 BILATERAL SCIATICA: ICD-10-CM

## 2022-12-07 DIAGNOSIS — M51.37 DEGENERATIVE DISC DISEASE AT L5-S1 LEVEL: ICD-10-CM

## 2022-12-07 DIAGNOSIS — M54.50 ACUTE BILATERAL LOW BACK PAIN WITHOUT SCIATICA: ICD-10-CM

## 2022-12-07 DIAGNOSIS — R26.81 GAIT INSTABILITY: ICD-10-CM

## 2022-12-07 DIAGNOSIS — M54.31 BILATERAL SCIATICA: ICD-10-CM

## 2022-12-08 NOTE — TELEPHONE ENCOUNTER
Bety Mcclain called to request a refill on her medication. Last office visit : 11/8/2022   Next office visit : Visit date not found     Last UDS:   Amphetamine Screen, Urine   Date Value Ref Range Status   11/08/2022 -  Final     Barbiturate Screen, Urine   Date Value Ref Range Status   11/08/2022 -  Final     Benzodiazepine Screen, Urine   Date Value Ref Range Status   11/08/2022 -  Final     Buprenorphine Urine   Date Value Ref Range Status   11/08/2022 -  Final     Cocaine Metabolite Screen, Urine   Date Value Ref Range Status   11/08/2022 -  Final     MDMA, Urine   Date Value Ref Range Status   11/08/2022 -  Final     Methamphetamine, Urine   Date Value Ref Range Status   11/08/2022 -  Final     Opiate Scrn, Ur   Date Value Ref Range Status   11/08/2022 -  Final     Oxycodone Screen, Ur   Date Value Ref Range Status   11/08/2022 -  Final     PCP Screen, Urine   Date Value Ref Range Status   11/08/2022 -  Final     Propoxyphene Screen, Urine   Date Value Ref Range Status   11/08/2022 -  Final     THC Screen, Urine   Date Value Ref Range Status   11/08/2022 -  Final     Tricyclic Antidepressants, Urine   Date Value Ref Range Status   11/08/2022 -  Final       Last Learta Filbert: 10/7/22  Medication Contract: 11/8/22   Last Fill: 11/7/22    Requested Prescriptions     Pending Prescriptions Disp Refills    gabapentin (NEURONTIN) 100 MG capsule 90 capsule 0     Sig: Take 1 capsule by mouth 3 times daily for 30 days. pantoprazole (PROTONIX) 20 MG tablet 30 tablet 3     Sig: Take 1 tablet by mouth daily         Please approve or refuse this medication.    Isa Espinosa LPN

## 2022-12-09 RX ORDER — PANTOPRAZOLE SODIUM 20 MG/1
20 TABLET, DELAYED RELEASE ORAL DAILY
Qty: 30 TABLET | Refills: 3 | Status: SHIPPED | OUTPATIENT
Start: 2022-12-09

## 2022-12-09 RX ORDER — GABAPENTIN 100 MG/1
100 CAPSULE ORAL 3 TIMES DAILY
Qty: 90 CAPSULE | Refills: 0 | Status: SHIPPED | OUTPATIENT
Start: 2022-12-09 | End: 2023-01-08

## 2022-12-12 ENCOUNTER — HOSPITAL ENCOUNTER (OUTPATIENT)
Dept: PREADMISSION TESTING | Age: 37
Discharge: HOME | End: 2022-12-16
Payer: COMMERCIAL

## 2022-12-12 VITALS — HEIGHT: 70 IN | BODY MASS INDEX: 32.93 KG/M2 | WEIGHT: 230 LBS

## 2022-12-12 LAB
ALBUMIN SERPL-MCNC: 4.4 G/DL (ref 3.5–5.2)
ALP BLD-CCNC: 124 U/L (ref 35–104)
ALT SERPL-CCNC: 9 U/L (ref 5–33)
ANION GAP SERPL CALCULATED.3IONS-SCNC: 9 MMOL/L (ref 7–19)
AST SERPL-CCNC: 14 U/L (ref 5–32)
BASOPHILS ABSOLUTE: 0.1 K/UL (ref 0–0.2)
BASOPHILS RELATIVE PERCENT: 0.7 % (ref 0–1)
BILIRUB SERPL-MCNC: 0.5 MG/DL (ref 0.2–1.2)
BUN BLDV-MCNC: 8 MG/DL (ref 6–20)
CALCIUM SERPL-MCNC: 9.4 MG/DL (ref 8.6–10)
CHLORIDE BLD-SCNC: 104 MMOL/L (ref 98–111)
CO2: 25 MMOL/L (ref 22–29)
CREAT SERPL-MCNC: 0.8 MG/DL (ref 0.5–0.9)
EOSINOPHILS ABSOLUTE: 0.5 K/UL (ref 0–0.6)
EOSINOPHILS RELATIVE PERCENT: 6.2 % (ref 0–5)
GFR SERPL CREATININE-BSD FRML MDRD: >60 ML/MIN/{1.73_M2}
GLUCOSE BLD-MCNC: 87 MG/DL (ref 74–109)
HCT VFR BLD CALC: 40.6 % (ref 37–47)
HEMOGLOBIN: 13.2 G/DL (ref 12–16)
IMMATURE GRANULOCYTES #: 0 K/UL
LYMPHOCYTES ABSOLUTE: 2 K/UL (ref 1.1–4.5)
LYMPHOCYTES RELATIVE PERCENT: 24.5 % (ref 20–40)
MCH RBC QN AUTO: 30.3 PG (ref 27–31)
MCHC RBC AUTO-ENTMCNC: 32.5 G/DL (ref 33–37)
MCV RBC AUTO: 93.1 FL (ref 81–99)
MONOCYTES ABSOLUTE: 0.6 K/UL (ref 0–0.9)
MONOCYTES RELATIVE PERCENT: 7.2 % (ref 0–10)
NEUTROPHILS ABSOLUTE: 5 K/UL (ref 1.5–7.5)
NEUTROPHILS RELATIVE PERCENT: 61 % (ref 50–65)
PDW BLD-RTO: 12.4 % (ref 11.5–14.5)
PLATELET # BLD: 325 K/UL (ref 130–400)
PMV BLD AUTO: 11.2 FL (ref 9.4–12.3)
POTASSIUM SERPL-SCNC: 4.2 MMOL/L (ref 3.5–5)
RBC # BLD: 4.36 M/UL (ref 4.2–5.4)
SODIUM BLD-SCNC: 138 MMOL/L (ref 136–145)
TOTAL PROTEIN: 7.4 G/DL (ref 6.6–8.7)
WBC # BLD: 8.2 K/UL (ref 4.8–10.8)

## 2022-12-12 PROCEDURE — 80053 COMPREHEN METABOLIC PANEL: CPT

## 2022-12-12 PROCEDURE — 85025 COMPLETE CBC W/AUTO DIFF WBC: CPT

## 2022-12-12 NOTE — DISCHARGE INSTRUCTIONS
The day before surgery you will receive a phone call from the surgery nurse to let you know what time to arrive on the day of surgery. This call will usually be between 2-4 PM. If you do not receive a phone call by 4 PM the day before your surgery please call [phone] and let them know you have not received an arrival time. If your surgery is on Monday, your call will be on the Friday before your Monday surgery. The morning of surgery, you may take all your prescribed medications with a sip of water. Any exceptions to this would be listed below:       [facility] not eat or drink anything after midnight, the night before your surgery. This is extremely important for your safety. Take a bath (or shower) the night before your surgery and you may brush your teeth the morning of your surgery. You will be scheduled to arrive at the hospital 2 hours before your surgery, or follow your surgeon's instructions. Dress comfortably. Wear loose clothing that will be easy to remove and comfortable for your trip home. You may wear eyeglasses or contacts but bring your cases with you as they must be remove before your surgery. Hearing aids and dentures will need to be removed before your surgery. Do not wear any jewelry, including body jewelry. All jewelry will need to be removed prior to your surgery. Do not wear fingernail polish or make-up. It is best not to bring any valuables with you. If you are to stay in the hospital overnight, bring your robe, slippers and personal toiletries that you may need. POSTOPERATIVE GUIDELINES AFTER RECEIVING ANESTHESIA    If you are to go home after your surgery, you will need a responsible adult to drive you home. You will not be able to take public transportation after your discharge from the Operative Care Unit unless you are accompanied by a        responsible adult.     On returning home, be sure to follow your physician's orders regarding diet, activity and medications. Remember, surgery with general anesthesia or sedation may leave you sleepy, very tired and with a decreased appetite for 12 to 24 hours. If you develop any post-surgical complications or problems, call your surgeon or [facility] Emergency Department ([phone]). [address] [place] [address] for Surgery Patients-Revised 6-    Visitors for surgery patients are essential for the patient's emotional well-being and care       post operatively. 2.   Visitor Expectations and Limitations          3. One visitor allowed with patients in the preop/postop rooms. 4.  A second visitor may sit in the waiting area. 5.  No children under 13 allowed in the pre-post op areas unless they are the patient. 6.  Two people may be with an underage surgical/procedural patient in preop/postop        room. 7.  If you are admitted to the hospital post operatively, there are NO RESTRICTIONS on       the floor at this time. 8.  If you are admitted to ICU postoperatively, you may have one visitor in the room from        7A-7P. A second visitor may sit in the ICU waiting room. No overnight visitors in         ICU waiting room.

## 2022-12-15 ENCOUNTER — PREP FOR PROCEDURE (OUTPATIENT)
Dept: SURGERY | Age: 37
End: 2022-12-15

## 2022-12-15 RX ORDER — SODIUM CHLORIDE 0.9 % (FLUSH) 0.9 %
5-40 SYRINGE (ML) INJECTION PRN
Status: CANCELLED | OUTPATIENT
Start: 2022-12-15

## 2022-12-15 RX ORDER — SODIUM CHLORIDE 9 MG/ML
INJECTION, SOLUTION INTRAVENOUS PRN
Status: CANCELLED | OUTPATIENT
Start: 2022-12-15

## 2022-12-15 RX ORDER — SODIUM CHLORIDE 0.9 % (FLUSH) 0.9 %
5-40 SYRINGE (ML) INJECTION EVERY 12 HOURS SCHEDULED
Status: CANCELLED | OUTPATIENT
Start: 2022-12-15

## 2022-12-15 ASSESSMENT — ENCOUNTER SYMPTOMS
VOMITING: 1
SHORTNESS OF BREATH: 0
ABDOMINAL PAIN: 1
EYE PAIN: 0
CONSTIPATION: 1
NAUSEA: 1
COUGH: 0
ABDOMINAL DISTENTION: 1
EYE REDNESS: 0
DIARRHEA: 1
BACK PAIN: 1
SORE THROAT: 0

## 2022-12-16 ENCOUNTER — ANESTHESIA EVENT (OUTPATIENT)
Dept: OPERATING ROOM | Age: 37
End: 2022-12-16
Payer: COMMERCIAL

## 2022-12-16 ENCOUNTER — ANESTHESIA (OUTPATIENT)
Dept: OPERATING ROOM | Age: 37
End: 2022-12-16
Payer: COMMERCIAL

## 2022-12-16 ENCOUNTER — HOSPITAL ENCOUNTER (OUTPATIENT)
Age: 37
Setting detail: OUTPATIENT SURGERY
Discharge: HOME | End: 2022-12-16
Attending: SURGERY | Admitting: SURGERY
Payer: COMMERCIAL

## 2022-12-16 VITALS
HEART RATE: 67 BPM | OXYGEN SATURATION: 97 % | HEIGHT: 70 IN | DIASTOLIC BLOOD PRESSURE: 71 MMHG | BODY MASS INDEX: 32.93 KG/M2 | TEMPERATURE: 98 F | SYSTOLIC BLOOD PRESSURE: 122 MMHG | RESPIRATION RATE: 14 BRPM | WEIGHT: 230 LBS

## 2022-12-16 DIAGNOSIS — K80.20 SYMPTOMATIC CHOLELITHIASIS: ICD-10-CM

## 2022-12-16 LAB — HCG(URINE) PREGNANCY TEST: NEGATIVE

## 2022-12-16 PROCEDURE — 88304 TISSUE EXAM BY PATHOLOGIST: CPT

## 2022-12-16 PROCEDURE — 3700000001 HC ADD 15 MINUTES (ANESTHESIA): Performed by: SURGERY

## 2022-12-16 PROCEDURE — 7100000010 HC PHASE II RECOVERY - FIRST 15 MIN: Performed by: SURGERY

## 2022-12-16 PROCEDURE — 6360000002 HC RX W HCPCS

## 2022-12-16 PROCEDURE — 6360000002 HC RX W HCPCS: Performed by: ANESTHESIOLOGY

## 2022-12-16 PROCEDURE — 7100000001 HC PACU RECOVERY - ADDTL 15 MIN: Performed by: SURGERY

## 2022-12-16 PROCEDURE — 84703 CHORIONIC GONADOTROPIN ASSAY: CPT

## 2022-12-16 PROCEDURE — 6360000002 HC RX W HCPCS: Performed by: SURGERY

## 2022-12-16 PROCEDURE — 2720000010 HC SURG SUPPLY STERILE: Performed by: SURGERY

## 2022-12-16 PROCEDURE — 2709999900 HC NON-CHARGEABLE SUPPLY: Performed by: SURGERY

## 2022-12-16 PROCEDURE — 2580000003 HC RX 258: Performed by: SURGERY

## 2022-12-16 PROCEDURE — S2900 ROBOTIC SURGICAL SYSTEM: HCPCS | Performed by: SURGERY

## 2022-12-16 PROCEDURE — 7100000000 HC PACU RECOVERY - FIRST 15 MIN: Performed by: SURGERY

## 2022-12-16 PROCEDURE — 2500000003 HC RX 250 WO HCPCS: Performed by: SURGERY

## 2022-12-16 PROCEDURE — 2500000003 HC RX 250 WO HCPCS

## 2022-12-16 PROCEDURE — 3700000000 HC ANESTHESIA ATTENDED CARE: Performed by: SURGERY

## 2022-12-16 PROCEDURE — 3600000009 HC SURGERY ROBOT BASE: Performed by: SURGERY

## 2022-12-16 PROCEDURE — 2580000003 HC RX 258: Performed by: ANESTHESIOLOGY

## 2022-12-16 PROCEDURE — 6370000000 HC RX 637 (ALT 250 FOR IP): Performed by: ANESTHESIOLOGY

## 2022-12-16 PROCEDURE — 3600000019 HC SURGERY ROBOT ADDTL 15MIN: Performed by: SURGERY

## 2022-12-16 RX ORDER — PROPOFOL 10 MG/ML
INJECTION, EMULSION INTRAVENOUS PRN
Status: DISCONTINUED | OUTPATIENT
Start: 2022-12-16 | End: 2022-12-16 | Stop reason: SDUPTHER

## 2022-12-16 RX ORDER — SODIUM CHLORIDE 0.9 % (FLUSH) 0.9 %
5-40 SYRINGE (ML) INJECTION EVERY 12 HOURS SCHEDULED
Status: DISCONTINUED | OUTPATIENT
Start: 2022-12-16 | End: 2022-12-16 | Stop reason: HOSPADM

## 2022-12-16 RX ORDER — LIDOCAINE HYDROCHLORIDE 10 MG/ML
1 INJECTION, SOLUTION EPIDURAL; INFILTRATION; INTRACAUDAL; PERINEURAL
Status: DISCONTINUED | OUTPATIENT
Start: 2022-12-16 | End: 2022-12-16 | Stop reason: HOSPADM

## 2022-12-16 RX ORDER — SCOLOPAMINE TRANSDERMAL SYSTEM 1 MG/1
1 PATCH, EXTENDED RELEASE TRANSDERMAL
Status: DISCONTINUED | OUTPATIENT
Start: 2022-12-16 | End: 2022-12-16 | Stop reason: HOSPADM

## 2022-12-16 RX ORDER — OXYCODONE HYDROCHLORIDE AND ACETAMINOPHEN 5; 325 MG/1; MG/1
2 TABLET ORAL EVERY 4 HOURS PRN
Status: DISCONTINUED | OUTPATIENT
Start: 2022-12-16 | End: 2022-12-16 | Stop reason: HOSPADM

## 2022-12-16 RX ORDER — SODIUM CHLORIDE, SODIUM LACTATE, POTASSIUM CHLORIDE, CALCIUM CHLORIDE 600; 310; 30; 20 MG/100ML; MG/100ML; MG/100ML; MG/100ML
INJECTION, SOLUTION INTRAVENOUS CONTINUOUS
Status: DISCONTINUED | OUTPATIENT
Start: 2022-12-16 | End: 2022-12-16 | Stop reason: HOSPADM

## 2022-12-16 RX ORDER — KETOROLAC TROMETHAMINE 30 MG/ML
INJECTION, SOLUTION INTRAMUSCULAR; INTRAVENOUS PRN
Status: DISCONTINUED | OUTPATIENT
Start: 2022-12-16 | End: 2022-12-16 | Stop reason: SDUPTHER

## 2022-12-16 RX ORDER — OXYCODONE HYDROCHLORIDE AND ACETAMINOPHEN 5; 325 MG/1; MG/1
2 TABLET ORAL EVERY 6 HOURS PRN
Qty: 22 TABLET | Refills: 0 | Status: SHIPPED | OUTPATIENT
Start: 2022-12-16 | End: 2022-12-21

## 2022-12-16 RX ORDER — MORPHINE SULFATE 4 MG/ML
2 INJECTION, SOLUTION INTRAMUSCULAR; INTRAVENOUS
Status: DISCONTINUED | OUTPATIENT
Start: 2022-12-16 | End: 2022-12-16 | Stop reason: HOSPADM

## 2022-12-16 RX ORDER — SODIUM CHLORIDE 9 MG/ML
INJECTION, SOLUTION INTRAVENOUS PRN
Status: DISCONTINUED | OUTPATIENT
Start: 2022-12-16 | End: 2022-12-16 | Stop reason: HOSPADM

## 2022-12-16 RX ORDER — SODIUM CHLORIDE 0.9 % (FLUSH) 0.9 %
5-40 SYRINGE (ML) INJECTION EVERY 12 HOURS SCHEDULED
Status: CANCELLED | OUTPATIENT
Start: 2022-12-16

## 2022-12-16 RX ORDER — MORPHINE SULFATE 2 MG/ML
2 INJECTION, SOLUTION INTRAMUSCULAR; INTRAVENOUS EVERY 5 MIN PRN
Status: DISCONTINUED | OUTPATIENT
Start: 2022-12-16 | End: 2022-12-16

## 2022-12-16 RX ORDER — HYDROMORPHONE HYDROCHLORIDE 1 MG/ML
0.5 INJECTION, SOLUTION INTRAMUSCULAR; INTRAVENOUS; SUBCUTANEOUS EVERY 5 MIN PRN
Status: DISCONTINUED | OUTPATIENT
Start: 2022-12-16 | End: 2022-12-16 | Stop reason: HOSPADM

## 2022-12-16 RX ORDER — MORPHINE SULFATE 4 MG/ML
4 INJECTION, SOLUTION INTRAMUSCULAR; INTRAVENOUS EVERY 5 MIN PRN
Status: DISCONTINUED | OUTPATIENT
Start: 2022-12-16 | End: 2022-12-16

## 2022-12-16 RX ORDER — LIDOCAINE HYDROCHLORIDE 10 MG/ML
INJECTION, SOLUTION EPIDURAL; INFILTRATION; INTRACAUDAL; PERINEURAL PRN
Status: DISCONTINUED | OUTPATIENT
Start: 2022-12-16 | End: 2022-12-16 | Stop reason: SDUPTHER

## 2022-12-16 RX ORDER — SODIUM CHLORIDE 0.9 % (FLUSH) 0.9 %
5-40 SYRINGE (ML) INJECTION PRN
Status: DISCONTINUED | OUTPATIENT
Start: 2022-12-16 | End: 2022-12-16 | Stop reason: HOSPADM

## 2022-12-16 RX ORDER — ONDANSETRON 2 MG/ML
4 INJECTION INTRAMUSCULAR; INTRAVENOUS EVERY 6 HOURS PRN
Status: CANCELLED | OUTPATIENT
Start: 2022-12-16

## 2022-12-16 RX ORDER — SODIUM CHLORIDE 0.9 % (FLUSH) 0.9 %
5-40 SYRINGE (ML) INJECTION PRN
Status: CANCELLED | OUTPATIENT
Start: 2022-12-16

## 2022-12-16 RX ORDER — MIDAZOLAM HYDROCHLORIDE 2 MG/2ML
2 INJECTION, SOLUTION INTRAMUSCULAR; INTRAVENOUS
Status: COMPLETED | OUTPATIENT
Start: 2022-12-16 | End: 2022-12-16

## 2022-12-16 RX ORDER — MEPERIDINE HYDROCHLORIDE 25 MG/ML
12.5 INJECTION INTRAMUSCULAR; INTRAVENOUS; SUBCUTANEOUS EVERY 5 MIN PRN
Status: DISCONTINUED | OUTPATIENT
Start: 2022-12-16 | End: 2022-12-16 | Stop reason: HOSPADM

## 2022-12-16 RX ORDER — BUPIVACAINE HYDROCHLORIDE 2.5 MG/ML
INJECTION, SOLUTION INFILTRATION; PERINEURAL PRN
Status: DISCONTINUED | OUTPATIENT
Start: 2022-12-16 | End: 2022-12-16 | Stop reason: ALTCHOICE

## 2022-12-16 RX ORDER — DEXAMETHASONE SODIUM PHOSPHATE 10 MG/ML
INJECTION, SOLUTION INTRAMUSCULAR; INTRAVENOUS PRN
Status: DISCONTINUED | OUTPATIENT
Start: 2022-12-16 | End: 2022-12-16 | Stop reason: SDUPTHER

## 2022-12-16 RX ORDER — METOCLOPRAMIDE HYDROCHLORIDE 5 MG/ML
10 INJECTION INTRAMUSCULAR; INTRAVENOUS
Status: COMPLETED | OUTPATIENT
Start: 2022-12-16 | End: 2022-12-16

## 2022-12-16 RX ORDER — FAMOTIDINE 20 MG/1
20 TABLET, FILM COATED ORAL ONCE
Status: DISCONTINUED | OUTPATIENT
Start: 2022-12-16 | End: 2022-12-16 | Stop reason: HOSPADM

## 2022-12-16 RX ORDER — DIPHENHYDRAMINE HYDROCHLORIDE 50 MG/ML
12.5 INJECTION INTRAMUSCULAR; INTRAVENOUS
Status: DISCONTINUED | OUTPATIENT
Start: 2022-12-16 | End: 2022-12-16 | Stop reason: HOSPADM

## 2022-12-16 RX ORDER — ROCURONIUM BROMIDE 10 MG/ML
INJECTION, SOLUTION INTRAVENOUS PRN
Status: DISCONTINUED | OUTPATIENT
Start: 2022-12-16 | End: 2022-12-16 | Stop reason: SDUPTHER

## 2022-12-16 RX ORDER — OXYCODONE HYDROCHLORIDE AND ACETAMINOPHEN 5; 325 MG/1; MG/1
1 TABLET ORAL EVERY 4 HOURS PRN
Status: DISCONTINUED | OUTPATIENT
Start: 2022-12-16 | End: 2022-12-16 | Stop reason: HOSPADM

## 2022-12-16 RX ORDER — ONDANSETRON 4 MG/1
4 TABLET, ORALLY DISINTEGRATING ORAL EVERY 8 HOURS PRN
Status: CANCELLED | OUTPATIENT
Start: 2022-12-16

## 2022-12-16 RX ORDER — INDOCYANINE GREEN AND WATER 25 MG
KIT INJECTION PRN
Status: DISCONTINUED | OUTPATIENT
Start: 2022-12-16 | End: 2022-12-16 | Stop reason: ALTCHOICE

## 2022-12-16 RX ORDER — DOCUSATE SODIUM 100 MG/1
100 CAPSULE, LIQUID FILLED ORAL 2 TIMES DAILY
Qty: 60 CAPSULE | Refills: 0 | Status: SHIPPED | OUTPATIENT
Start: 2022-12-16 | End: 2023-01-15

## 2022-12-16 RX ORDER — FENTANYL CITRATE 50 UG/ML
INJECTION, SOLUTION INTRAMUSCULAR; INTRAVENOUS PRN
Status: DISCONTINUED | OUTPATIENT
Start: 2022-12-16 | End: 2022-12-16 | Stop reason: SDUPTHER

## 2022-12-16 RX ORDER — SODIUM CHLORIDE 9 MG/ML
INJECTION, SOLUTION INTRAVENOUS PRN
Status: CANCELLED | OUTPATIENT
Start: 2022-12-16

## 2022-12-16 RX ORDER — HYDROMORPHONE HYDROCHLORIDE 1 MG/ML
0.25 INJECTION, SOLUTION INTRAMUSCULAR; INTRAVENOUS; SUBCUTANEOUS EVERY 5 MIN PRN
Status: DISCONTINUED | OUTPATIENT
Start: 2022-12-16 | End: 2022-12-16 | Stop reason: HOSPADM

## 2022-12-16 RX ORDER — ONDANSETRON 4 MG/1
4 TABLET, ORALLY DISINTEGRATING ORAL EVERY 8 HOURS PRN
Qty: 10 TABLET | Refills: 2 | Status: SHIPPED | OUTPATIENT
Start: 2022-12-16

## 2022-12-16 RX ORDER — ONDANSETRON 2 MG/ML
INJECTION INTRAMUSCULAR; INTRAVENOUS PRN
Status: DISCONTINUED | OUTPATIENT
Start: 2022-12-16 | End: 2022-12-16 | Stop reason: SDUPTHER

## 2022-12-16 RX ORDER — MORPHINE SULFATE 4 MG/ML
4 INJECTION, SOLUTION INTRAMUSCULAR; INTRAVENOUS
Status: DISCONTINUED | OUTPATIENT
Start: 2022-12-16 | End: 2022-12-16 | Stop reason: HOSPADM

## 2022-12-16 RX ADMIN — HYDROMORPHONE HYDROCHLORIDE 0.25 MG: 1 INJECTION, SOLUTION INTRAMUSCULAR; INTRAVENOUS; SUBCUTANEOUS at 10:28

## 2022-12-16 RX ADMIN — PROPOFOL 150 MG: 10 INJECTION, EMULSION INTRAVENOUS at 08:05

## 2022-12-16 RX ADMIN — METOCLOPRAMIDE 10 MG: 5 INJECTION, SOLUTION INTRAMUSCULAR; INTRAVENOUS at 10:45

## 2022-12-16 RX ADMIN — HYDROMORPHONE HYDROCHLORIDE 0.25 MG: 1 INJECTION, SOLUTION INTRAMUSCULAR; INTRAVENOUS; SUBCUTANEOUS at 10:44

## 2022-12-16 RX ADMIN — MIDAZOLAM HYDROCHLORIDE 2 MG: 1 INJECTION, SOLUTION INTRAMUSCULAR; INTRAVENOUS at 07:12

## 2022-12-16 RX ADMIN — MEPERIDINE HYDROCHLORIDE 12.5 MG: 25 INJECTION, SOLUTION INTRAMUSCULAR; INTRAVENOUS; SUBCUTANEOUS at 10:15

## 2022-12-16 RX ADMIN — SUGAMMADEX 200 MG: 100 INJECTION, SOLUTION INTRAVENOUS at 09:50

## 2022-12-16 RX ADMIN — SODIUM CHLORIDE, POTASSIUM CHLORIDE, SODIUM LACTATE AND CALCIUM CHLORIDE: 600; 310; 30; 20 INJECTION, SOLUTION INTRAVENOUS at 07:12

## 2022-12-16 RX ADMIN — KETOROLAC TROMETHAMINE 30 MG: 30 INJECTION, SOLUTION INTRAMUSCULAR; INTRAVENOUS at 09:36

## 2022-12-16 RX ADMIN — FENTANYL CITRATE 50 MCG: 50 INJECTION, SOLUTION INTRAMUSCULAR; INTRAVENOUS at 08:05

## 2022-12-16 RX ADMIN — CEFAZOLIN 2000 MG: 2 INJECTION, POWDER, FOR SOLUTION INTRAMUSCULAR; INTRAVENOUS at 08:10

## 2022-12-16 RX ADMIN — HYDROMORPHONE HYDROCHLORIDE 0.25 MG: 1 INJECTION, SOLUTION INTRAMUSCULAR; INTRAVENOUS; SUBCUTANEOUS at 10:55

## 2022-12-16 RX ADMIN — HYDROMORPHONE HYDROCHLORIDE 0.5 MG: 1 INJECTION, SOLUTION INTRAMUSCULAR; INTRAVENOUS; SUBCUTANEOUS at 09:53

## 2022-12-16 RX ADMIN — HYDROMORPHONE HYDROCHLORIDE 0.5 MG: 1 INJECTION, SOLUTION INTRAMUSCULAR; INTRAVENOUS; SUBCUTANEOUS at 09:42

## 2022-12-16 RX ADMIN — FENTANYL CITRATE 50 MCG: 50 INJECTION, SOLUTION INTRAMUSCULAR; INTRAVENOUS at 08:10

## 2022-12-16 RX ADMIN — ONDANSETRON 4 MG: 2 INJECTION INTRAMUSCULAR; INTRAVENOUS at 08:16

## 2022-12-16 RX ADMIN — DEXAMETHASONE SODIUM PHOSPHATE 10 MG: 10 INJECTION, SOLUTION INTRAMUSCULAR; INTRAVENOUS at 08:16

## 2022-12-16 RX ADMIN — ROCURONIUM BROMIDE 50 MG: 10 INJECTION, SOLUTION INTRAVENOUS at 08:05

## 2022-12-16 RX ADMIN — LIDOCAINE HYDROCHLORIDE 50 MG: 10 INJECTION, SOLUTION EPIDURAL; INFILTRATION; INTRACAUDAL; PERINEURAL at 08:05

## 2022-12-16 ASSESSMENT — PAIN - FUNCTIONAL ASSESSMENT: PAIN_FUNCTIONAL_ASSESSMENT: NONE - DENIES PAIN

## 2022-12-16 ASSESSMENT — PAIN DESCRIPTION - LOCATION
LOCATION: ABDOMEN
LOCATION: ABDOMEN

## 2022-12-16 ASSESSMENT — LIFESTYLE VARIABLES: SMOKING_STATUS: 0

## 2022-12-16 ASSESSMENT — PAIN SCALES - GENERAL
PAINLEVEL_OUTOF10: 4

## 2022-12-16 ASSESSMENT — ENCOUNTER SYMPTOMS: SHORTNESS OF BREATH: 0

## 2022-12-16 NOTE — ANESTHESIA PRE PROCEDURE
Department of Anesthesiology  Preprocedure Note       Name:  Rebecca Crisostomo   Age:  40 y.o.  :  1985                                          MRN:  [id number]         Date:  2022      Surgeon: Lucia Frazier):  Lowell Stovall MD    Procedure: Procedure(s):  ROBOTIC LAPAROSCOPIC CHOLECYSTECTOMY    Medications prior to admission:   Prior to Admission medications    Medication Sig Start Date End Date Taking? Authorizing Provider   gabapentin (NEURONTIN) 100 MG capsule Take 1 capsule by mouth 3 times daily for 30 days. 10/1/71 4/1/00  RODERICK Leon   pantoprazole (PROTONIX) 20 MG tablet Take 1 tablet by mouth daily    RODERICK Leon   ibuprofen (ADVIL;MOTRIN) 200 MG tablet Take 200 mg by mouth every 6 hours as needed for Pain    Historical Provider, MD   tiZANidine (ZANAFLEX) 4 MG tablet Take 1 tablet by mouth 3 times daily as needed (back pain) 10/7/22   Vladislav Resendez MD   meloxicam (MOBIC) 15 MG tablet Take 1 tablet by mouth daily as needed for Pain 5/3/11   RODERICK Leon   diclofenac (VOLTAREN) 50 MG EC tablet Take 1 tablet by mouth in the morning and 1 tablet at noon and 1 tablet in the evening. Take with meals.  22   Ameena Juarez MD   cyclobenzaprine (FLEXERIL) 5 MG tablet TAKE 1 TABLET BY MOUTH 3 TIMES A DAY AS NEEDED FOR MUSCLE SPASMS  Patient taking differently: Take 5 mg by mouth 3 times daily as needed TAKE 1 TABLET BY MOUTH 3 TIMES A DAY AS NEEDED FOR MUSCLE SPASMS 3/99/88   RODERICK Leon   cetirizine (ZYRTEC) 10 MG tablet Take 10 mg by mouth daily     Historical Provider, MD       Current medications:    Current Facility-Administered Medications   Medication Dose Route Frequency Provider Last Rate Last Admin    sodium chloride flush 0.9 % injection 5-40 mL  5-40 mL IntraVENous 2 times per day Lowell Stovall MD        sodium chloride flush 0.9 % injection 5-40 mL  5-40 mL IntraVENous PRN Lowell Stovall MD        0.9 % sodium chloride infusion   IntraVENous PRN Nereyda Costa MD        ceFAZolin (ANCEF) 2,000 mg in sterile water 20 mL IV syringe  2,000 mg IntraVENous On Call to [address], MD        lactated ringers infusion   IntraVENous Continuous Alexei Chavez MD           Allergies:  No Known Allergies    Problem List:    Patient Active Problem List   Diagnosis Code    Lumbar pain M54.50       Past Medical History:        Diagnosis Date    Abnormal Pap smear of cervix         Allergic rhinitis     Lumbar herniated disc        Past Surgical History:        Procedure Laterality Date     SECTION             Social History:    Social History     Tobacco Use    Smoking status: Never    Smokeless tobacco: Never   Substance Use Topics    Alcohol use: Yes     Alcohol/week: 0.0 standard drinks     Comment: rare                                Counseling given: Not Answered      Vital Signs (Current):   Vitals:    22 0637   BP: (!) 147/103   Pulse: 82   Resp: 16   Temp: 98.5 °F (36.9 °C)   TempSrc: Tympanic   SpO2: 99%   Weight: 230 lb (104.3 kg)   Height: 5' 10\" (1.778 m)                                              BP Readings from Last 3 Encounters:   22 (!) 147/103   22 120/70   22 128/88       NPO Status: Time of last liquid consumption: 1900                        Time of last solid consumption: 1500                        Date of last liquid consumption: 12/15/22                        Date of last solid food consumption: 12/15/22    BMI:   Wt Readings from Last 3 Encounters:   22 230 lb (104.3 kg)   22 230 lb (104.3 kg)   22 228 lb 12.8 oz (103.8 kg)     Body mass index is 33 kg/m².     CBC:   Lab Results   Component Value Date/Time    WBC 8.2 2022 01:17 PM    RBC 4.36 2022 01:17 PM    HGB 13.2 2022 01:17 PM    HCT 40.6 2022 01:17 PM    MCV 93.1 2022 01:17 PM    RDW 12.4 2022 01:17 PM     2022 01:17 PM       CMP: Lab Results   Component Value Date/Time     12/12/2022 01:17 PM    K 4.2 12/12/2022 01:17 PM     12/12/2022 01:17 PM    CO2 25 12/12/2022 01:17 PM    BUN 8 12/12/2022 01:17 PM    CREATININE 0.8 12/12/2022 01:17 PM    LABGLOM >60 12/12/2022 01:17 PM    GLUCOSE 87 12/12/2022 01:17 PM    PROT 7.4 12/12/2022 01:17 PM    CALCIUM 9.4 12/12/2022 01:17 PM    BILITOT 0.5 12/12/2022 01:17 PM    ALKPHOS 124 12/12/2022 01:17 PM    AST 14 12/12/2022 01:17 PM    ALT 9 12/12/2022 01:17 PM       POC Tests: No results for input(s): POCGLU, POCNA, POCK, POCCL, POCBUN, POCHEMO, POCHCT in the last 72 hours. Coags: No results found for: PROTIME, INR, APTT    HCG (If Applicable):   Lab Results   Component Value Date    PREGTESTUR Negative 12/16/2022        ABGs: No results found for: PHART, PO2ART, LWY1YSQ, OKV4YTF, BEART, [id number]     Type & Screen (If Applicable):  No results found for: LABABO, LABRH    Drug/Infectious Status (If Applicable):  No results found for: HIV, HEPCAB    COVID-19 Screening (If Applicable):   Lab Results   Component Value Date/Time    COVID19 DETECTED 07/27/2020 08:50 AM           Anesthesia Evaluation  Patient summary reviewed and Nursing notes reviewed no history of anesthetic complications:   Airway: Mallampati: II  TM distance: >3 FB   Neck ROM: full  Mouth opening: > = 3 FB   Dental: normal exam         Pulmonary:Negative Pulmonary ROS and normal exam  breath sounds clear to auscultation      (-) shortness of breath and not a current smoker          Patient did not smoke on day of surgery.                  Cardiovascular:        (-) hypertension, CAD,  angina and  CHF    NYHA Classification: I  ECG reviewed  Rhythm: regular  Rate: normal           Beta Blocker:  Not on Beta Blocker         Neuro/Psych:   Negative Neuro/Psych ROS     (-) seizures, CVA and depression/anxiety            GI/Hepatic/Renal: Neg GI/Hepatic/Renal ROS       (-) hiatal hernia and GERD       Endo/Other: Negative Endo/Other ROS             Pt had PAT visit. Abdominal:       Abdomen: soft. Vascular: Other Findings:           Anesthesia Plan      general     ASA 1     (Iv zofran within 30 min of closing versed preop )  Induction: intravenous. BIS  MIPS: Postoperative opioids intended and Prophylactic antiemetics administered. Anesthetic plan and risks discussed with patient. Use of blood products discussed with patient whom. Plan discussed with CRNA.     Attending anesthesiologist reviewed and agrees with Pre Eval content                Andre Meléndez MD   12/16/2022

## 2022-12-16 NOTE — OP NOTE
OPERATIVE REPORT    PATIENT NAME: Sang Barbosa  MEDICAL RECORD NO. [id number]  SURGEON: Corona Dennis MD MD   Primary Care Physician: Stacy Bui, RODERICK  Date: 12/16/2022    PREOPERATIVE DIAGNOSIS  Symptomatic Cholelithiasis    POSTOPERATIVE DIAGNOSIS  Same, with acute and chronic cholecystitis    PROCEDURE  Robotic Assisted Laparoscopic cholecystectomy with Firefly (ICG)    ASSISTANT  Nagi Aceves    Anesthesia: GEA    EBL: Minimal    Findings: thickening and adhesions related to chronic and acute cholecystitis    INDICATIONS  The patient presented with a complaint of RUQ abdominal pain. US was consistent with gallstones. PROCEDURE  After informed consent was obtained, the patient was taken to the operating room and placed in supine position. After the induction of adequate general endotracheal anesthesia the abdomen was prepped in the usual sterile fashion. Time out performed. Local anesthestic was administered to the inferior portion of the umbilicus, an incision was made, the base of the umbilicus was elevated, and the veress needle was inserted. The abdominal cavity was insufflated and the 8 mm port was inserted. The laparoscope was advanced and inspection undertaken. There was no evidence of trauma from the trocar insertion. Eight millimeter working ports were placed in line with the umbilicus, two ports on the patient's right and one on the left side. The patient was placed in reverse trendelenberg position and rotated to the left. The [address] operating system was brought into the field and docked. Working instruments were advanced and the fundus of the gallbladder was grasped and elevated. The peritoneum over the neck of the gallbladder was incised and dissection was begun. Firefly vision was used to visualize the cystic duct and biliary anatomy. The neck of the gallbladder was dissected with blunt and electorcautery to visualize the cystic duct.   This was cleared of  surrounding tissue. Adjacent to this the cystic artery was identified and also cleared of surrounding tissue. Dissection was carried up the cystic plate. A critical view of safety was obtained. The cystic duct was clipped proximally and distally with hemoclips and divided. The cystic artery was divided in a similar fashion. The gallbladder was then released from the undersurface of the liver using cautery. Once free, it was placed it in an Endocatch retrieval bag and removed through the umbilical incision. The area was irrigated and suctioned as needed. The clips were inspected, and were in good position with good hemostasis and no evidence of bile leak. The fascia at the umbilical site was closed using a 0 vicryl and the fascial closure device. The working ports were removed under vision with no bleeding noted. The pneumoperitoneum was released. Skin incisions were closed with interrupted 4-0 Monocryl subcuticular suture followed by Dermabond dressing. Ms. Sera Sher tolerated her surgery well and she was taken to PACU in  satisfactory condition.         Chance Merida MD  Electronically signed 12/16/2022 at 9:45 AM

## 2022-12-16 NOTE — DISCHARGE INSTR - DIET
Good nutrition is important when healing from an illness, injury, or surgery. Follow any nutrition recommendations given to you during your hospital stay. If you were given an oral nutrition supplement while in the hospital, continue to take this supplement at home. You can take it with meals, in-between meals, and/or before bedtime. These supplements can be purchased at most local grocery stores, pharmacies, and chain [employer]stores. If you have any questions about your diet or nutrition, call the hospital and ask for the dietitian.     Your usual diet as tolerated

## 2022-12-16 NOTE — ANESTHESIA POSTPROCEDURE EVALUATION
Department of Anesthesiology  Postprocedure Note    Patient: Hong Ferrera  MRN: [id number]  YOB: 1985  Date of evaluation: 12/16/2022      Procedure Summary     Date: 12/16/22 Room / Location: [address]    Anesthesia Start: 0800 Anesthesia Stop: 1003    Procedure: ROBOTIC LAPAROSCOPIC CHOLECYSTECTOMY Diagnosis:       Symptomatic cholelithiasis      (Symptomatic cholelithiasis [K80.20])    Surgeons: Sagar Souza MD Responsible Provider: RODERICK Rosen CRNA    Anesthesia Type: general ASA Status: 1          Anesthesia Type: No value filed.     Radha Phase I: Radha Score: 8    Radha Phase II:        Anesthesia Post Evaluation    Patient location during evaluation: PACU  Patient participation: complete - patient participated  Level of consciousness: awake and alert  Pain score: 0  Airway patency: patent  Nausea & Vomiting: no nausea and no vomiting  Complications: no  Cardiovascular status: hemodynamically stable and blood pressure returned to baseline  Respiratory status: acceptable and nasal cannula  Hydration status: stable

## 2022-12-16 NOTE — DISCHARGE INSTR - ACTIVITY
Activity as tolerated except no lifting more than 10 pounds for the first week and no driving if taking pain medication. Okay to shower over incisions, but do not submerge under water like a tub or pool for one week. Watch for redness, drainage, fever, or worsening abdominal pain, and call for any questions or concerns. Take stool softener as directed to try to prevent constipation related to pain medication and anesthesia, and hold for loose stools. If you have baseline constipation, you may want to start over the counter miralax from the start as well to help prevent abdominal pain from constipation.

## 2022-12-16 NOTE — INTERVAL H&P NOTE
Update History & Physical    The patient's History and Physical of December 1, 2022 was reviewed with the patient and I examined the patient. There was no change. The surgical site was confirmed by the patient and me. Plan: The risks, benefits, expected outcome, and alternative to the recommended procedure have been discussed with the patient. Patient understands and wants to proceed with the procedure.      Electronically signed by Osorio Yeung MD on 12/16/2022 at 7:53 AM

## 2022-12-16 NOTE — PROGRESS NOTES
CLINICAL PHARMACY NOTE: MEDS TO BEDS    Total # of Prescriptions Filled: 3   The following medications were delivered to the patient:  Ondansetron 4mg odt  Oxycodone/apap 5-325mg  Docusate 100mg    Additional Documentation:   The patient paid with a credit card and was handed the medications at her bedside in [facility].

## 2022-12-29 ENCOUNTER — OFFICE VISIT (OUTPATIENT)
Dept: SURGERY | Age: 37
End: 2022-12-29

## 2022-12-29 VITALS
OXYGEN SATURATION: 99 % | TEMPERATURE: 98.5 F | HEIGHT: 70 IN | HEART RATE: 78 BPM | WEIGHT: 230 LBS | BODY MASS INDEX: 32.93 KG/M2

## 2022-12-29 DIAGNOSIS — Z09 POSTOPERATIVE EXAMINATION: Primary | ICD-10-CM

## 2022-12-29 PROCEDURE — 99024 POSTOP FOLLOW-UP VISIT: CPT | Performed by: SURGERY

## 2022-12-29 NOTE — PROGRESS NOTES
Postop Progress Note    Rohini Davis Just presents to the office for postop follow up, 2 weeks s/p laparoscopic robotic cholecystectomy. She reports that she has been doing well, denies any acute issues, is tolerating a diet, is having regular bowel movements, and post operative pain has improved. Objective    Vitals:    12/29/22 0953   Pulse: 78   Temp: 98.5 °F (36.9 °C)   SpO2: 99%     General: alert, cooperative and no distress  Abdomen: Soft, NT, ND, incisions C/D/I, no erythema or induration    Pathology:  FINAL DIAGNOSIS:     Gallbladder, cholecystectomy:   1. Moderate acute and chronic cholecystitis. 2.  Cholelithiasis. CBG/CBG     Assessment  41 yo female s/p laparoscopic robotic cholecystectomy  1) Doing well post operatively. Okay to have diet and activity as tolerated  2) Follow up in general surgery as needed, and call for any questions or concerns.     Electronically signed by Adarsh Bullard MD on 12/29/2022 at 10:04 AM

## 2023-04-18 LAB
CHOLEST SERPL-MCNC: 185 MG/DL (ref 160–199)
GLUCOSE SERPL-MCNC: 96 MG/DL (ref 74–109)
HDLC SERPL-MCNC: 46 MG/DL (ref 65–121)
LDLC SERPL CALC-MCNC: 120 MG/DL
TRIGL SERPL-MCNC: 93 MG/DL (ref 0–149)

## 2023-09-12 SDOH — ECONOMIC STABILITY: HOUSING INSECURITY
IN THE LAST 12 MONTHS, WAS THERE A TIME WHEN YOU DID NOT HAVE A STEADY PLACE TO SLEEP OR SLEPT IN A SHELTER (INCLUDING NOW)?: NO

## 2023-09-12 SDOH — ECONOMIC STABILITY: TRANSPORTATION INSECURITY
IN THE PAST 12 MONTHS, HAS LACK OF TRANSPORTATION KEPT YOU FROM MEETINGS, WORK, OR FROM GETTING THINGS NEEDED FOR DAILY LIVING?: NO

## 2023-09-12 SDOH — ECONOMIC STABILITY: FOOD INSECURITY: WITHIN THE PAST 12 MONTHS, YOU WORRIED THAT YOUR FOOD WOULD RUN OUT BEFORE YOU GOT MONEY TO BUY MORE.: NEVER TRUE

## 2023-09-12 SDOH — ECONOMIC STABILITY: INCOME INSECURITY: HOW HARD IS IT FOR YOU TO PAY FOR THE VERY BASICS LIKE FOOD, HOUSING, MEDICAL CARE, AND HEATING?: NOT VERY HARD

## 2023-09-12 SDOH — ECONOMIC STABILITY: FOOD INSECURITY: WITHIN THE PAST 12 MONTHS, THE FOOD YOU BOUGHT JUST DIDN'T LAST AND YOU DIDN'T HAVE MONEY TO GET MORE.: NEVER TRUE

## 2023-09-12 ASSESSMENT — PATIENT HEALTH QUESTIONNAIRE - PHQ9
1. LITTLE INTEREST OR PLEASURE IN DOING THINGS: SEVERAL DAYS
SUM OF ALL RESPONSES TO PHQ QUESTIONS 1-9: 2
SUM OF ALL RESPONSES TO PHQ QUESTIONS 1-9: 2
SUM OF ALL RESPONSES TO PHQ9 QUESTIONS 1 & 2: 2
SUM OF ALL RESPONSES TO PHQ QUESTIONS 1-9: 2
SUM OF ALL RESPONSES TO PHQ QUESTIONS 1-9: 2
1. LITTLE INTEREST OR PLEASURE IN DOING THINGS: 1
2. FEELING DOWN, DEPRESSED OR HOPELESS: 1
2. FEELING DOWN, DEPRESSED OR HOPELESS: SEVERAL DAYS
SUM OF ALL RESPONSES TO PHQ9 QUESTIONS 1 & 2: 2

## 2023-09-13 ENCOUNTER — OFFICE VISIT (OUTPATIENT)
Dept: PRIMARY CARE CLINIC | Age: 38
End: 2023-09-13
Payer: COMMERCIAL

## 2023-09-13 VITALS
HEART RATE: 76 BPM | DIASTOLIC BLOOD PRESSURE: 84 MMHG | BODY MASS INDEX: 33.78 KG/M2 | SYSTOLIC BLOOD PRESSURE: 124 MMHG | TEMPERATURE: 98.5 F | OXYGEN SATURATION: 98 % | WEIGHT: 235.4 LBS

## 2023-09-13 DIAGNOSIS — L30.9 DERMATITIS: Primary | ICD-10-CM

## 2023-09-13 PROCEDURE — 99213 OFFICE O/P EST LOW 20 MIN: CPT | Performed by: NURSE PRACTITIONER

## 2023-09-13 PROCEDURE — 96372 THER/PROPH/DIAG INJ SC/IM: CPT | Performed by: NURSE PRACTITIONER

## 2023-09-13 RX ORDER — PREDNISONE 10 MG/1
10 TABLET ORAL 2 TIMES DAILY
Qty: 10 TABLET | Refills: 0 | Status: SHIPPED | OUTPATIENT
Start: 2023-09-14 | End: 2023-09-19

## 2023-09-13 RX ORDER — PERMETHRIN 50 MG/G
CREAM TOPICAL
Qty: 60 G | Refills: 0 | Status: SHIPPED | OUTPATIENT
Start: 2023-09-13

## 2023-09-13 RX ORDER — CLOBETASOL PROPIONATE 0.5 MG/G
CREAM TOPICAL
Qty: 45 G | Refills: 0 | Status: SHIPPED | OUTPATIENT
Start: 2023-09-13

## 2023-09-13 RX ORDER — DEXAMETHASONE SODIUM PHOSPHATE 10 MG/ML
5 INJECTION, SOLUTION INTRAMUSCULAR; INTRAVENOUS ONCE
Status: COMPLETED | OUTPATIENT
Start: 2023-09-13 | End: 2023-09-13

## 2023-09-13 RX ADMIN — DEXAMETHASONE SODIUM PHOSPHATE 5 MG: 10 INJECTION, SOLUTION INTRAMUSCULAR; INTRAVENOUS at 11:14

## 2023-09-13 ASSESSMENT — ENCOUNTER SYMPTOMS
RESPIRATORY NEGATIVE: 1
EYES NEGATIVE: 1
GASTROINTESTINAL NEGATIVE: 1
ALLERGIC/IMMUNOLOGIC NEGATIVE: 1

## 2023-09-13 NOTE — PROGRESS NOTES
given educational materials -see patient instructions. Discussed use, benefit, and side effects of prescribed medications. All patient questions answered. Pt voiced understanding. Reviewed health maintenance. Instructed to continue currentmedications, diet and exercise. Patient agreed with treatment plan. Follow up as directed. MEDICATIONS:  Orders Placed This Encounter   Medications    dexamethasone (DECADRON) injection 5 mg    predniSONE (DELTASONE) 10 MG tablet     Sig: Take 1 tablet by mouth 2 times daily for 5 days     Dispense:  10 tablet     Refill:  0    clobetasol (TEMOVATE) 0.05 % cream     Sig: Apply topically 2 times daily. Dispense:  45 g     Refill:  0    permethrin (ELIMITE) 5 % cream     Sig: Apply topically as directed     Dispense:  60 g     Refill:  0         ORDERS:  No orders of the defined types were placed in this encounter. Follow-up:  Return for keep follow up with PCP. PATIENT INSTRUCTIONS:  There are no Patient Instructions on file for this visit. Electronically signed by RODERICK Ro on 9/14/2023 at 6:21 PM    EMR Dragon/transcription disclaimer:  Much of thisencounter note is electronic transcription/translation of spoken language to printed texts. The electronic translation of spoken language may be erroneous, or at times, nonsensical words or phrases may be inadvertentlytranscribed.   Although I have reviewed the note for such errors, some may still exist.

## 2023-12-05 ENCOUNTER — OFFICE VISIT (OUTPATIENT)
Dept: PRIMARY CARE CLINIC | Age: 38
End: 2023-12-05
Payer: COMMERCIAL

## 2023-12-05 VITALS
SYSTOLIC BLOOD PRESSURE: 122 MMHG | HEIGHT: 70 IN | TEMPERATURE: 97.5 F | WEIGHT: 244.6 LBS | HEART RATE: 78 BPM | DIASTOLIC BLOOD PRESSURE: 84 MMHG | BODY MASS INDEX: 35.02 KG/M2 | OXYGEN SATURATION: 98 %

## 2023-12-05 DIAGNOSIS — Z00.00 ENCOUNTER FOR ANNUAL PHYSICAL EXAM: ICD-10-CM

## 2023-12-05 DIAGNOSIS — M10.9 ACUTE GOUT INVOLVING TOE OF LEFT FOOT, UNSPECIFIED CAUSE: ICD-10-CM

## 2023-12-05 DIAGNOSIS — Z00.00 ENCOUNTER FOR ANNUAL PHYSICAL EXAM: Primary | ICD-10-CM

## 2023-12-05 LAB
25(OH)D3 SERPL-MCNC: 14.6 NG/ML
ALBUMIN SERPL-MCNC: 4.6 G/DL (ref 3.5–5.2)
ALP SERPL-CCNC: 116 U/L (ref 35–104)
ALT SERPL-CCNC: 10 U/L (ref 5–33)
ANION GAP SERPL CALCULATED.3IONS-SCNC: 11 MMOL/L (ref 7–19)
AST SERPL-CCNC: 14 U/L (ref 5–32)
BASOPHILS # BLD: 0.1 K/UL (ref 0–0.2)
BASOPHILS NFR BLD: 0.9 % (ref 0–1)
BILIRUB SERPL-MCNC: 0.5 MG/DL (ref 0.2–1.2)
BUN SERPL-MCNC: 11 MG/DL (ref 6–20)
CALCIUM SERPL-MCNC: 9.5 MG/DL (ref 8.6–10)
CHLORIDE SERPL-SCNC: 106 MMOL/L (ref 98–111)
CHOLEST SERPL-MCNC: 191 MG/DL (ref 160–199)
CO2 SERPL-SCNC: 24 MMOL/L (ref 22–29)
CREAT SERPL-MCNC: 0.7 MG/DL (ref 0.5–0.9)
EOSINOPHIL # BLD: 0.2 K/UL (ref 0–0.6)
EOSINOPHIL NFR BLD: 3.5 % (ref 0–5)
ERYTHROCYTE [DISTWIDTH] IN BLOOD BY AUTOMATED COUNT: 12.5 % (ref 11.5–14.5)
GLUCOSE SERPL-MCNC: 96 MG/DL (ref 74–109)
HBA1C MFR BLD: 5.4 % (ref 4–6)
HCT VFR BLD AUTO: 41.6 % (ref 37–47)
HCV AB SERPL QL IA: NORMAL
HDLC SERPL-MCNC: 50 MG/DL (ref 65–121)
HGB BLD-MCNC: 14 G/DL (ref 12–16)
HIV-1 P24 AG: NORMAL
HIV1+2 AB SERPLBLD QL IA.RAPID: NORMAL
IMM GRANULOCYTES # BLD: 0 K/UL
LDLC SERPL CALC-MCNC: 122 MG/DL
LYMPHOCYTES # BLD: 1.5 K/UL (ref 1.1–4.5)
LYMPHOCYTES NFR BLD: 21.5 % (ref 20–40)
MCH RBC QN AUTO: 31.1 PG (ref 27–31)
MCHC RBC AUTO-ENTMCNC: 33.7 G/DL (ref 33–37)
MCV RBC AUTO: 92.4 FL (ref 81–99)
MONOCYTES # BLD: 0.5 K/UL (ref 0–0.9)
MONOCYTES NFR BLD: 7.6 % (ref 0–10)
NEUTROPHILS # BLD: 4.6 K/UL (ref 1.5–7.5)
NEUTS SEG NFR BLD: 66.2 % (ref 50–65)
PLATELET # BLD AUTO: 302 K/UL (ref 130–400)
PMV BLD AUTO: 10.5 FL (ref 9.4–12.3)
POTASSIUM SERPL-SCNC: 4.4 MMOL/L (ref 3.5–5)
PROT SERPL-MCNC: 7.7 G/DL (ref 6.6–8.7)
RBC # BLD AUTO: 4.5 M/UL (ref 4.2–5.4)
SODIUM SERPL-SCNC: 141 MMOL/L (ref 136–145)
TRIGL SERPL-MCNC: 95 MG/DL (ref 0–149)
TSH SERPL DL<=0.005 MIU/L-ACNC: 1.78 UIU/ML (ref 0.27–4.2)
URATE SERPL-MCNC: 7.9 MG/DL (ref 2.4–5.7)
WBC # BLD AUTO: 6.9 K/UL (ref 4.8–10.8)

## 2023-12-05 PROCEDURE — 99395 PREV VISIT EST AGE 18-39: CPT | Performed by: NURSE PRACTITIONER

## 2023-12-05 PROCEDURE — 99213 OFFICE O/P EST LOW 20 MIN: CPT | Performed by: NURSE PRACTITIONER

## 2023-12-05 ASSESSMENT — ENCOUNTER SYMPTOMS
SHORTNESS OF BREATH: 0
VOMITING: 0
COUGH: 0
BACK PAIN: 0
VOICE CHANGE: 0
NAUSEA: 0
COLOR CHANGE: 0
PHOTOPHOBIA: 0
RHINORRHEA: 0

## 2023-12-05 NOTE — PROGRESS NOTES
Diagnosis Orders   1. Encounter for annual physical exam  CBC with Auto Differential    Comprehensive Metabolic Panel    Lipid Panel    Hemoglobin A1C    Vitamin D 25 Hydroxy    Urinalysis with Reflex to Culture    TSH    Uric Acid    HIV Rapid 1&2    Hepatitis C Antibody      2. Acute gout involving toe of left foot, unspecified cause  CBC with Auto Differential    Comprehensive Metabolic Panel    Lipid Panel    Hemoglobin A1C    Vitamin D 25 Hydroxy    Urinalysis with Reflex to Culture    TSH    Uric Acid            Plan:     Fasting labs in [address] today. Follow-up annually for physical.     PDMP Monitoring:    Last PDMP Jovany Pisano as Reviewed:  Review User Review Instant Review Result            Urine Drug Screenings (1 yr)       POCT Rapid Drug Screen  Collected: 11/8/2022 (Final result)                  Medication Contract and Consent for Opioid Use Documents Filed       Patient Documents       Type of Document Status Date Received Received By Description    Medication Contract Received 11/8/2022 10:15 AM Gonzalez Ortega 11.08.22                     Patient given educational materials -see patient instructions. Discussed use, benefit, and side effects of prescribed medications. All patient questions answered. Pt voiced understanding. Reviewed health maintenance. Instructed to continue currentmedications, diet and exercise. Patient agreed with treatment plan. Follow up as directed. MEDICATIONS:  No orders of the defined types were placed in this encounter. ORDERS:  Orders Placed This Encounter   Procedures    CBC with Auto Differential    Comprehensive Metabolic Panel    Lipid Panel    Hemoglobin A1C    Vitamin D 25 Hydroxy    Urinalysis with Reflex to Culture    TSH    Uric Acid    HIV Rapid 1&2    Hepatitis C Antibody       Follow-up:  Return in about 1 year (around 12/5/2024) for in office. PATIENT INSTRUCTIONS:  Patient Instructions   Fasting labs in [address] today.    Follow-up

## 2023-12-06 ENCOUNTER — TELEPHONE (OUTPATIENT)
Dept: PRIMARY CARE CLINIC | Age: 38
End: 2023-12-06

## 2023-12-06 RX ORDER — ERGOCALCIFEROL 1.25 MG/1
50000 CAPSULE ORAL WEEKLY
Qty: 12 CAPSULE | Refills: 1 | Status: SHIPPED | OUTPATIENT
Start: 2023-12-06

## 2023-12-06 NOTE — TELEPHONE ENCOUNTER
----- Message from RODERICK Cuellar sent at 12/6/2023  9:22 AM CST -----  Please inform patient that uric acid level was elevated which indicates she did/does have gout. She should let us know of recurring symptoms in the future. Please inform patient of vitamin d deficiency. I recommend 50,000 units once weekly.    All other labs were normal.

## 2024-04-16 LAB
CHOLEST SERPL-MCNC: 202 MG/DL (ref 160–199)
GLUCOSE SERPL-MCNC: 102 MG/DL (ref 74–109)
HDLC SERPL-MCNC: 42 MG/DL (ref 65–121)
LDLC SERPL CALC-MCNC: 138 MG/DL
TRIGL SERPL-MCNC: 109 MG/DL (ref 0–149)

## 2024-06-12 RX ORDER — ERGOCALCIFEROL 1.25 MG/1
50000 CAPSULE ORAL WEEKLY
Qty: 12 CAPSULE | Refills: 1 | Status: SHIPPED | OUTPATIENT
Start: 2024-06-12

## 2024-11-07 ENCOUNTER — OFFICE VISIT (OUTPATIENT)
Dept: PRIMARY CARE CLINIC | Age: 39
End: 2024-11-07
Payer: COMMERCIAL

## 2024-11-07 VITALS
TEMPERATURE: 97.2 F | BODY MASS INDEX: 34.39 KG/M2 | HEIGHT: 70 IN | OXYGEN SATURATION: 98 % | SYSTOLIC BLOOD PRESSURE: 118 MMHG | WEIGHT: 240.2 LBS | HEART RATE: 72 BPM | DIASTOLIC BLOOD PRESSURE: 78 MMHG

## 2024-11-07 DIAGNOSIS — Z00.00 ENCOUNTER FOR ANNUAL PHYSICAL EXAM: ICD-10-CM

## 2024-11-07 DIAGNOSIS — Z00.00 ENCOUNTER FOR ANNUAL PHYSICAL EXAM: Primary | ICD-10-CM

## 2024-11-07 DIAGNOSIS — E55.9 VITAMIN D DEFICIENCY: ICD-10-CM

## 2024-11-07 LAB
25(OH)D3 SERPL-MCNC: 28.9 NG/ML
ALBUMIN SERPL-MCNC: 4.4 G/DL (ref 3.5–5.2)
ALP SERPL-CCNC: 122 U/L (ref 35–104)
ALT SERPL-CCNC: 15 U/L (ref 5–33)
ANION GAP SERPL CALCULATED.3IONS-SCNC: 12 MMOL/L (ref 7–19)
AST SERPL-CCNC: 13 U/L (ref 5–32)
BASOPHILS # BLD: 0.1 K/UL (ref 0–0.2)
BASOPHILS NFR BLD: 0.7 % (ref 0–1)
BILIRUB SERPL-MCNC: 0.5 MG/DL (ref 0.2–1.2)
BUN SERPL-MCNC: 12 MG/DL (ref 6–20)
CALCIUM SERPL-MCNC: 9 MG/DL (ref 8.6–10)
CHLORIDE SERPL-SCNC: 103 MMOL/L (ref 98–111)
CHOLEST SERPL-MCNC: 160 MG/DL (ref 0–199)
CO2 SERPL-SCNC: 23 MMOL/L (ref 22–29)
CREAT SERPL-MCNC: 0.8 MG/DL (ref 0.5–0.9)
EOSINOPHIL # BLD: 0.2 K/UL (ref 0–0.6)
EOSINOPHIL NFR BLD: 2.9 % (ref 0–5)
ERYTHROCYTE [DISTWIDTH] IN BLOOD BY AUTOMATED COUNT: 12.8 % (ref 11.5–14.5)
GLUCOSE SERPL-MCNC: 92 MG/DL (ref 70–99)
HBA1C MFR BLD: 5.1 % (ref 4–5.6)
HCT VFR BLD AUTO: 40.1 % (ref 37–47)
HDLC SERPL-MCNC: 38 MG/DL (ref 40–60)
HGB BLD-MCNC: 13.2 G/DL (ref 12–16)
IMM GRANULOCYTES # BLD: 0 K/UL
LDLC SERPL CALC-MCNC: 109 MG/DL
LYMPHOCYTES # BLD: 1.5 K/UL (ref 1.1–4.5)
LYMPHOCYTES NFR BLD: 18.2 % (ref 20–40)
MCH RBC QN AUTO: 30.7 PG (ref 27–31)
MCHC RBC AUTO-ENTMCNC: 32.9 G/DL (ref 33–37)
MCV RBC AUTO: 93.3 FL (ref 81–99)
MONOCYTES # BLD: 0.6 K/UL (ref 0–0.9)
MONOCYTES NFR BLD: 7.5 % (ref 0–10)
NEUTROPHILS # BLD: 5.8 K/UL (ref 1.5–7.5)
NEUTS SEG NFR BLD: 70.5 % (ref 50–65)
PLATELET # BLD AUTO: 267 K/UL (ref 130–400)
PMV BLD AUTO: 10.8 FL (ref 9.4–12.3)
POTASSIUM SERPL-SCNC: 4.3 MMOL/L (ref 3.5–5)
PROT SERPL-MCNC: 7.5 G/DL (ref 6.4–8.3)
RBC # BLD AUTO: 4.3 M/UL (ref 4.2–5.4)
SODIUM SERPL-SCNC: 138 MMOL/L (ref 136–145)
TRIGL SERPL-MCNC: 67 MG/DL (ref 0–149)
TSH SERPL DL<=0.005 MIU/L-ACNC: 1.2 UIU/ML (ref 0.27–4.2)
WBC # BLD AUTO: 8.3 K/UL (ref 4.8–10.8)

## 2024-11-07 PROCEDURE — 99395 PREV VISIT EST AGE 18-39: CPT | Performed by: NURSE PRACTITIONER

## 2024-11-07 SDOH — ECONOMIC STABILITY: FOOD INSECURITY: WITHIN THE PAST 12 MONTHS, THE FOOD YOU BOUGHT JUST DIDN'T LAST AND YOU DIDN'T HAVE MONEY TO GET MORE.: NEVER TRUE

## 2024-11-07 SDOH — ECONOMIC STABILITY: INCOME INSECURITY: HOW HARD IS IT FOR YOU TO PAY FOR THE VERY BASICS LIKE FOOD, HOUSING, MEDICAL CARE, AND HEATING?: NOT HARD AT ALL

## 2024-11-07 SDOH — ECONOMIC STABILITY: FOOD INSECURITY: WITHIN THE PAST 12 MONTHS, YOU WORRIED THAT YOUR FOOD WOULD RUN OUT BEFORE YOU GOT MONEY TO BUY MORE.: NEVER TRUE

## 2024-11-07 ASSESSMENT — ENCOUNTER SYMPTOMS
COLOR CHANGE: 0
NAUSEA: 0
VOICE CHANGE: 0
PHOTOPHOBIA: 0
SHORTNESS OF BREATH: 0
VOMITING: 0
RHINORRHEA: 0
BACK PAIN: 0
COUGH: 0

## 2024-11-07 ASSESSMENT — PATIENT HEALTH QUESTIONNAIRE - PHQ9
SUM OF ALL RESPONSES TO PHQ QUESTIONS 1-9: 0
1. LITTLE INTEREST OR PLEASURE IN DOING THINGS: NOT AT ALL
SUM OF ALL RESPONSES TO PHQ QUESTIONS 1-9: 0
2. FEELING DOWN, DEPRESSED OR HOPELESS: NOT AT ALL
SUM OF ALL RESPONSES TO PHQ9 QUESTIONS 1 & 2: 0
SUM OF ALL RESPONSES TO PHQ QUESTIONS 1-9: 0
SUM OF ALL RESPONSES TO PHQ QUESTIONS 1-9: 0

## 2024-11-07 NOTE — PROGRESS NOTES
Discussed continuing vitamin d weekly or  may switch to 2000 units daily if preferred. She will follow-up annually for physical.     PDMP Monitoring:    Last PDMP Daniel as Reviewed:  Review User Review Instant Review Result            Urine Drug Screenings (1 yr)       POCT Rapid Drug Screen  Collected: 11/8/2022 (Final result)                  Medication Contract and Consent for Opioid Use Documents Filed       Patient Documents       Type of Document Status Date Received Received By Description    Medication Contract Received 11/8/2022 10:15 AM MILDRED STERN 11.08.22                     Patient given educational materials -see patient instructions.  Discussed use, benefit, and side effects of prescribed medications.  All patient questions answered.  Pt voiced understanding. Reviewed health maintenance.  Instructed to continue currentmedications, diet and exercise.  Patient agreed with treatment plan. Follow up as directed.   MEDICATIONS:  Orders Placed This Encounter   Medications    tiZANidine (ZANAFLEX) 4 MG tablet     Sig: Take 1 tablet by mouth 3 times daily as needed (back pain)     Dispense:  90 tablet     Refill:  2         ORDERS:  Orders Placed This Encounter   Procedures    Vitamin D 25 Hydroxy    Lipid Panel    Hemoglobin A1C    Comprehensive Metabolic Panel    TSH    CBC with Auto Differential       Follow-up:  Return in about 1 year (around 11/7/2025) for physical.    PATIENT INSTRUCTIONS:  Patient Instructions   Fasting labs in [address].   Follow-up annually for physical.   Electronically signed by RODERICK Clarke on 11/7/2024 at 10:30 AM    EMR Dragon/transcription disclaimer:  Much of thisencounter note is electronic transcription/translation of spoken language to printed texts.  The electronic translation of spoken language may be erroneous, or at times, nonsensical words or phrases may be inadvertentlytranscribed.  Although I have reviewed the note for such errors, some may still

## 2024-12-13 NOTE — PROGRESS NOTES
for human papillomavirus (HPV)  -     PAP SMEAR  -     Human papillomavirus (HPV) DNA probe thin prep high risk  3. Cervical cancer screening  -     PAP SMEAR  -     Human papillomavirus (HPV) DNA probe thin prep high risk         Assessment & Plan  1. Routine Pap smear.  Her blood pressure and pulse are within normal limits. The uterus is not palpable, suggesting a posterior orientation. It does not exhibit signs of enlargement, although it may be slightly bulky. She is not experiencing dysmenorrhea or menorrhagia. A Pap smear was performed today.    2. Hypopigmented area on the perineum.  A small ulcer with hypopigmentation from 12:00 to 6:00 on the perineum was noted. She is advised to monitor the area. If there is no improvement, a biopsy may be considered.    Follow-up  The patient will follow up in 2 months.      No follow-ups on file.         The patient (or guardian, if applicable) and other individuals in attendance with the patient were advised that Artificial Intelligence will be utilized during this visit to record, process the conversation to generate a clinical note and to support improvement of the AI technology. The patient (or guardian, if applicable) and other individuals in attendance at the appointment consented to the use of AI, including the recording.      An electronic signature was used to authenticate this note.    --Maricruz Jarrell MD

## 2024-12-16 ENCOUNTER — OFFICE VISIT (OUTPATIENT)
Dept: OBGYN CLINIC | Age: 39
End: 2024-12-16

## 2024-12-16 VITALS
WEIGHT: 242 LBS | BODY MASS INDEX: 34.65 KG/M2 | DIASTOLIC BLOOD PRESSURE: 82 MMHG | HEART RATE: 64 BPM | SYSTOLIC BLOOD PRESSURE: 120 MMHG | HEIGHT: 70 IN

## 2024-12-16 DIAGNOSIS — Z11.51 ENCOUNTER FOR SCREENING FOR HUMAN PAPILLOMAVIRUS (HPV): ICD-10-CM

## 2024-12-16 DIAGNOSIS — Z12.4 CERVICAL CANCER SCREENING: ICD-10-CM

## 2024-12-16 DIAGNOSIS — Z01.419 WELL WOMAN EXAM WITH ROUTINE GYNECOLOGICAL EXAM: Primary | ICD-10-CM

## 2024-12-19 LAB
HPV HR 12 DNA SPEC QL NAA+PROBE: NOT DETECTED
HPV16 DNA SPEC QL NAA+PROBE: NOT DETECTED
HPV16+18+H RISK 12 DNA SPEC-IMP: NORMAL
HPV18 DNA SPEC QL NAA+PROBE: NOT DETECTED

## 2025-01-28 NOTE — TELEPHONE ENCOUNTER
Mahnaz Villatoro called to request a refill on her medication.      Last office visit : 11/7/2024   Next office visit : Visit date not found     Requested Prescriptions     Pending Prescriptions Disp Refills    vitamin D (ERGOCALCIFEROL) 1.25 MG (47766 UT) CAPS capsule [Pharmacy Med Name: VITAMIN D2 (ERGOCALCIFEROL) 1.25MG CAPS] 12 capsule 1     Sig: TAKE ONE CAPSULE BY MOUTH ONE TIME WEEKLY            Alicia Tolbert MA

## 2025-01-29 RX ORDER — ERGOCALCIFEROL 1.25 MG/1
CAPSULE, LIQUID FILLED ORAL
Qty: 12 CAPSULE | Refills: 1 | Status: SHIPPED | OUTPATIENT
Start: 2025-01-29

## 2025-02-11 ENCOUNTER — OFFICE VISIT (OUTPATIENT)
Dept: PRIMARY CARE CLINIC | Age: 40
End: 2025-02-11

## 2025-02-11 VITALS
DIASTOLIC BLOOD PRESSURE: 84 MMHG | HEART RATE: 60 BPM | WEIGHT: 241 LBS | TEMPERATURE: 97.6 F | SYSTOLIC BLOOD PRESSURE: 120 MMHG | BODY MASS INDEX: 34.5 KG/M2 | OXYGEN SATURATION: 96 % | HEIGHT: 70 IN

## 2025-02-11 DIAGNOSIS — R30.0 DYSURIA: ICD-10-CM

## 2025-02-11 DIAGNOSIS — R10.2 PELVIC PAIN: Primary | ICD-10-CM

## 2025-02-11 LAB
APPEARANCE FLUID: CLEAR
BILIRUBIN, POC: NORMAL
BLOOD URINE, POC: NORMAL
CLARITY, POC: CLEAR
COLOR, POC: YELLOW
GLUCOSE URINE, POC: NORMAL MG/DL
KETONES, POC: NORMAL MG/DL
LEUKOCYTE EST, POC: NORMAL
NITRITE, POC: NORMAL
PH, POC: 6.5
PROTEIN, POC: NORMAL MG/DL
SPECIFIC GRAVITY, POC: 1.02
UROBILINOGEN, POC: 0.2 MG/DL

## 2025-02-11 SDOH — ECONOMIC STABILITY: FOOD INSECURITY: WITHIN THE PAST 12 MONTHS, THE FOOD YOU BOUGHT JUST DIDN'T LAST AND YOU DIDN'T HAVE MONEY TO GET MORE.: NEVER TRUE

## 2025-02-11 SDOH — ECONOMIC STABILITY: FOOD INSECURITY: WITHIN THE PAST 12 MONTHS, YOU WORRIED THAT YOUR FOOD WOULD RUN OUT BEFORE YOU GOT MONEY TO BUY MORE.: NEVER TRUE

## 2025-02-11 ASSESSMENT — ENCOUNTER SYMPTOMS
RHINORRHEA: 0
COUGH: 0
NAUSEA: 0
ABDOMINAL PAIN: 1
VOICE CHANGE: 0
VOMITING: 0
SHORTNESS OF BREATH: 0
COLOR CHANGE: 0
PHOTOPHOBIA: 0
BACK PAIN: 0

## 2025-02-11 ASSESSMENT — PATIENT HEALTH QUESTIONNAIRE - PHQ9
SUM OF ALL RESPONSES TO PHQ9 QUESTIONS 1 & 2: 0
SUM OF ALL RESPONSES TO PHQ QUESTIONS 1-9: 0
2. FEELING DOWN, DEPRESSED OR HOPELESS: NOT AT ALL
1. LITTLE INTEREST OR PLEASURE IN DOING THINGS: NOT AT ALL
SUM OF ALL RESPONSES TO PHQ QUESTIONS 1-9: 0

## 2025-02-11 NOTE — PROGRESS NOTES
[facility]  [address]  [address]  [place] [address]  Dept: [phone]  Dept Fax: [phone]  Loc: [phone]    Mahnaz Villatoro is a 39 y.o. female who presents today for her medical conditions/complaints as noted below.  Mahnaz Villatoro is c/o of Abdominal Pain (Pt in office with abdominal pain x 3 days)        HPI:     HPI   Chief Complaint   Patient presents with    Abdominal Pain     Pt in office with abdominal pain x 3 days     Patient presents today for evaluation of lower abdominal pain that has been present x 3 days. She states it feels more muscular. She denies bleeding or discharge. She states occasionally she has a few hours of pelvic discomfort. She states her symptoms have almost resolved today.       Past Medical History:   Diagnosis Date    Abnormal Pap smear of cervix         Allergic rhinitis     Lumbar herniated disc       Past Surgical History:   Procedure Laterality Date     SECTION          CHOLECYSTECTOMY, LAPAROSCOPIC N/A 2022    ROBOTIC LAPAROSCOPIC CHOLECYSTECTOMY performed by Leonila Villa MD at [facility] OR           2025     2:48 PM 2024    11:18 AM 2024    10:03 AM 2023    11:00 AM 2023    10:43 AM 2022     9:53 AM   Vitals   SYSTOLIC 120 120 118 122 124    DIASTOLIC 84 82 78 84 84    Site  Left Upper Arm       Position  Sitting       Cuff Size  Large Adult       Pulse 60 64 72 78 76 78   Temp 97.6 °F (36.4 °C)  97.2 °F (36.2 °C) 97.5 °F (36.4 °C) 98.5 °F (36.9 °C) 98.5 °F (36.9 °C)   SpO2 96 %  98 % 98 % 98 % 99 %   Weight - Scale 241 lb 242 lb 240 lb 3.2 oz 244 lb 9.6 oz 235 lb 6.4 oz 230 lb   Height 5' 10\" 5' 10\" 5' 10\" 5' 10\"  5 10\"   Body Mass Index 34.58 kg/m2 34.72 kg/m2 34.47 kg/m2 35.1 kg/m2  33 kg/m2   Pain Score    Three     Pain Loc    13         Family History   Problem Relation Age of Onset    Cancer Paternal Aunt         skin    Stroke Paternal Aunt

## (undated) DEVICE — DRAPE,UTILITY,XL,4/PK,STERILE: Brand: MEDLINE

## (undated) DEVICE — ARM DRAPE

## (undated) DEVICE — ADHESIVE SKIN CLSR 0.7ML TOP DERMBND ADV

## (undated) DEVICE — SOLUTION ANTIFOG VIS SYS CLEARIFY LAPSCP

## (undated) DEVICE — TISSUE RETRIEVAL SYSTEM: Brand: INZII RETRIEVAL SYSTEM

## (undated) DEVICE — SUTURE MCRYL SZ 4-0 L18IN ABSRB UD L19MM PS-2 3/8 CIR PRIM Y496G

## (undated) DEVICE — SUCTION IRRIGATOR: Brand: ENDOWRIST

## (undated) DEVICE — ROYAL SILK SURGICAL GOWN, XXL: Brand: CONVERTORS

## (undated) DEVICE — GLOVE SURG SZ 7 CRM LTX FREE POLYISOPRENE POLYMER BEAD ANTI

## (undated) DEVICE — TROCAR: Brand: KII FIOS FIRST ENTRY

## (undated) DEVICE — SUTURE VCRL SZ 0 L27IN ABSRB VLT L36MM UR-5 5/8 CIR J376H

## (undated) DEVICE — CUFF BLD PRESSURE 1 TUBE AD 25-34 CM ARM VLY FLEXIPORT DISP

## (undated) DEVICE — SOLUTION IV IRRIG POUR BRL 0.9% SODIUM CHL 2F7124

## (undated) DEVICE — GENERAL LAP CDS

## (undated) DEVICE — SOLUTION IRRIG 1000ML STRL H2O USP PLAS POUR BTL

## (undated) DEVICE — NEEDLE INSUF L120MM ULT VERES ENDOPATH

## (undated) DEVICE — COVER LT HNDL BLU PLAS

## (undated) DEVICE — CANNULA SEAL

## (undated) DEVICE — BLADELESS OBTURATOR: Brand: WECK VISTA

## (undated) DEVICE — COVER,MAYO STAND,STERILE: Brand: MEDLINE